# Patient Record
Sex: FEMALE | Race: WHITE | Employment: FULL TIME | URBAN - METROPOLITAN AREA
[De-identification: names, ages, dates, MRNs, and addresses within clinical notes are randomized per-mention and may not be internally consistent; named-entity substitution may affect disease eponyms.]

---

## 2017-11-18 ENCOUNTER — APPOINTMENT (OUTPATIENT)
Dept: CT IMAGING | Age: 52
End: 2017-11-18
Attending: EMERGENCY MEDICINE
Payer: COMMERCIAL

## 2017-11-18 ENCOUNTER — APPOINTMENT (OUTPATIENT)
Dept: GENERAL RADIOLOGY | Age: 52
End: 2017-11-18
Attending: EMERGENCY MEDICINE
Payer: COMMERCIAL

## 2017-11-18 ENCOUNTER — HOSPITAL ENCOUNTER (EMERGENCY)
Age: 52
Discharge: HOME | End: 2017-11-19
Attending: EMERGENCY MEDICINE
Payer: COMMERCIAL

## 2017-11-18 DIAGNOSIS — R20.0 NUMBNESS OF LEFT HAND: ICD-10-CM

## 2017-11-18 DIAGNOSIS — R07.9 ACUTE CHEST PAIN: Primary | ICD-10-CM

## 2017-11-18 LAB
ALBUMIN SERPL-MCNC: 3.4 G/DL (ref 3.5–5)
ALBUMIN/GLOB SERPL: 1 {RATIO} (ref 1.1–2.2)
ALP SERPL-CCNC: 51 U/L (ref 45–117)
ALT SERPL-CCNC: 20 U/L (ref 12–78)
ANION GAP SERPL CALC-SCNC: 9 MMOL/L (ref 5–15)
AST SERPL-CCNC: 13 U/L (ref 15–37)
BASOPHILS # BLD: 0 K/UL (ref 0–0.1)
BASOPHILS NFR BLD: 1 % (ref 0–1)
BILIRUB SERPL-MCNC: 0.2 MG/DL (ref 0.2–1)
BUN SERPL-MCNC: 16 MG/DL (ref 6–20)
BUN/CREAT SERPL: 21 (ref 12–20)
CALCIUM SERPL-MCNC: 8.8 MG/DL (ref 8.5–10.1)
CHLORIDE SERPL-SCNC: 107 MMOL/L (ref 97–108)
CO2 SERPL-SCNC: 26 MMOL/L (ref 21–32)
CREAT SERPL-MCNC: 0.77 MG/DL (ref 0.55–1.02)
D DIMER PPP FEU-MCNC: 1.23 MG/L FEU (ref 0–0.65)
EOSINOPHIL # BLD: 0.1 K/UL (ref 0–0.4)
EOSINOPHIL NFR BLD: 2 % (ref 0–7)
ERYTHROCYTE [DISTWIDTH] IN BLOOD BY AUTOMATED COUNT: 12.7 % (ref 11.5–14.5)
GLOBULIN SER CALC-MCNC: 3.4 G/DL (ref 2–4)
GLUCOSE SERPL-MCNC: 121 MG/DL (ref 65–100)
HCT VFR BLD AUTO: 38.4 % (ref 35–47)
HGB BLD-MCNC: 12.6 G/DL (ref 11.5–16)
LYMPHOCYTES # BLD: 2.8 K/UL (ref 0.8–3.5)
LYMPHOCYTES NFR BLD: 37 % (ref 12–49)
MCH RBC QN AUTO: 30.4 PG (ref 26–34)
MCHC RBC AUTO-ENTMCNC: 32.8 G/DL (ref 30–36.5)
MCV RBC AUTO: 92.8 FL (ref 80–99)
MONOCYTES # BLD: 0.5 K/UL (ref 0–1)
MONOCYTES NFR BLD: 6 % (ref 5–13)
NEUTS SEG # BLD: 4.2 K/UL (ref 1.8–8)
NEUTS SEG NFR BLD: 54 % (ref 32–75)
PLATELET # BLD AUTO: 216 K/UL (ref 150–400)
POTASSIUM SERPL-SCNC: 3.5 MMOL/L (ref 3.5–5.1)
PROT SERPL-MCNC: 6.8 G/DL (ref 6.4–8.2)
RBC # BLD AUTO: 4.14 M/UL (ref 3.8–5.2)
SODIUM SERPL-SCNC: 142 MMOL/L (ref 136–145)
TROPONIN I SERPL-MCNC: <0.04 NG/ML
WBC # BLD AUTO: 7.6 K/UL (ref 3.6–11)

## 2017-11-18 PROCEDURE — 85379 FIBRIN DEGRADATION QUANT: CPT | Performed by: EMERGENCY MEDICINE

## 2017-11-18 PROCEDURE — 96374 THER/PROPH/DIAG INJ IV PUSH: CPT

## 2017-11-18 PROCEDURE — 93005 ELECTROCARDIOGRAM TRACING: CPT

## 2017-11-18 PROCEDURE — 96361 HYDRATE IV INFUSION ADD-ON: CPT

## 2017-11-18 PROCEDURE — 99284 EMERGENCY DEPT VISIT MOD MDM: CPT

## 2017-11-18 PROCEDURE — 70450 CT HEAD/BRAIN W/O DYE: CPT

## 2017-11-18 PROCEDURE — 85025 COMPLETE CBC W/AUTO DIFF WBC: CPT | Performed by: EMERGENCY MEDICINE

## 2017-11-18 PROCEDURE — 71275 CT ANGIOGRAPHY CHEST: CPT

## 2017-11-18 PROCEDURE — 36415 COLL VENOUS BLD VENIPUNCTURE: CPT | Performed by: EMERGENCY MEDICINE

## 2017-11-18 PROCEDURE — 80053 COMPREHEN METABOLIC PANEL: CPT | Performed by: EMERGENCY MEDICINE

## 2017-11-18 PROCEDURE — 84484 ASSAY OF TROPONIN QUANT: CPT | Performed by: EMERGENCY MEDICINE

## 2017-11-18 PROCEDURE — 71020 XR CHEST PA LAT: CPT

## 2017-11-18 RX ORDER — SODIUM CHLORIDE 0.9 % (FLUSH) 0.9 %
10 SYRINGE (ML) INJECTION
Status: COMPLETED | OUTPATIENT
Start: 2017-11-18 | End: 2017-11-19

## 2017-11-19 VITALS
BODY MASS INDEX: 31.41 KG/M2 | WEIGHT: 184 LBS | OXYGEN SATURATION: 94 % | HEIGHT: 64 IN | TEMPERATURE: 98.3 F | RESPIRATION RATE: 15 BRPM | DIASTOLIC BLOOD PRESSURE: 60 MMHG | HEART RATE: 65 BPM | SYSTOLIC BLOOD PRESSURE: 101 MMHG

## 2017-11-19 LAB
ATRIAL RATE: 73 BPM
CALCULATED P AXIS, ECG09: 47 DEGREES
CALCULATED R AXIS, ECG10: -8 DEGREES
CALCULATED T AXIS, ECG11: 22 DEGREES
DIAGNOSIS, 93000: NORMAL
P-R INTERVAL, ECG05: 144 MS
Q-T INTERVAL, ECG07: 424 MS
QRS DURATION, ECG06: 84 MS
QTC CALCULATION (BEZET), ECG08: 467 MS
TROPONIN I BLD-MCNC: <0.04 NG/ML (ref 0–0.08)
VENTRICULAR RATE, ECG03: 73 BPM

## 2017-11-19 PROCEDURE — 74011000258 HC RX REV CODE- 258: Performed by: EMERGENCY MEDICINE

## 2017-11-19 PROCEDURE — 74011636320 HC RX REV CODE- 636/320: Performed by: EMERGENCY MEDICINE

## 2017-11-19 PROCEDURE — 74011250636 HC RX REV CODE- 250/636: Performed by: EMERGENCY MEDICINE

## 2017-11-19 PROCEDURE — 84484 ASSAY OF TROPONIN QUANT: CPT

## 2017-11-19 RX ORDER — KETOROLAC TROMETHAMINE 30 MG/ML
15 INJECTION, SOLUTION INTRAMUSCULAR; INTRAVENOUS
Status: COMPLETED | OUTPATIENT
Start: 2017-11-19 | End: 2017-11-19

## 2017-11-19 RX ADMIN — Medication 10 ML: at 00:08

## 2017-11-19 RX ADMIN — SODIUM CHLORIDE 100 ML: 900 INJECTION, SOLUTION INTRAVENOUS at 00:08

## 2017-11-19 RX ADMIN — KETOROLAC TROMETHAMINE 15 MG: 30 INJECTION, SOLUTION INTRAMUSCULAR at 00:39

## 2017-11-19 RX ADMIN — IOPAMIDOL 70 ML: 755 INJECTION, SOLUTION INTRAVENOUS at 00:08

## 2017-11-19 NOTE — ED PROVIDER NOTES
HPI Comments: 50 y.o. female with past medical history significant for thyroid cancer who presents to the ED with chief complaint of chest pain. Patient reports an episode of \"sharp\" mid chest pain with onset \"this morning,\" reports waking up with her pain. Patient reports a second episode of chest pain ~2 hours ago. Patient reports her pain is worse with taking deep breaths. Patient reports undergoing a preventative screening colonoscopy \"yesterday,\" reports being told it was \"normal.\" Patient reports numbness in her left five fingers with onset ~1 week ago; denies any numbness in her hand, wrist, or arm. Patient reports a history of \"two\" c-sections and \"three\" vaginal deliveries. Patient reports a history of thyroid cancer and subsequent thyroidectomy; reports being on Synthroid regularly now. Patient denies being on any other hormone replacement therapy. Patient reports a history of a hysterectomy. Patient reports her mother has a history of \"four\" cardiac stent placements; reports her father  of an MI at the age of ~62 years. Patient denies tobacco use. Patient denies hand pain, neck pain, leg pain, leg swelling, cough, fever, SOB, and weakness. There are no other acute medical concerns at this time. PCP: None    Note written by Lourdes Sheets, as dictated by Arabella Meehan MD 10:16 PM     The history is provided by the patient. Past Medical History:   Diagnosis Date    Thyroid cancer ([facility] [address]     @ 24years old       Past Surgical History:   Procedure Laterality Date    HX  SECTION      HX THYROIDECTOMY      HX TOTAL LAPAROSCOPIC HYSTERECTOMY           History reviewed. No pertinent family history. Social History     Social History    Marital status:      Spouse name: N/A    Number of children: N/A    Years of education: N/A     Occupational History    Not on file.      Social History Main Topics    Smoking status: Never Smoker    Smokeless tobacco: Not on file    Alcohol use Yes      Comment: socially- 3 or 4 days a week    Drug use: Not on file    Sexual activity: Not on file     Other Topics Concern    Not on file     Social History Narrative         ALLERGIES: Review of patient's allergies indicates no known allergies. Review of Systems   Constitutional: Negative for fever. Respiratory: Negative for cough and shortness of breath. Cardiovascular: Positive for chest pain. Negative for leg swelling. Musculoskeletal: Negative for myalgias and neck pain. Neurological: Positive for numbness. Negative for weakness. All other systems reviewed and are negative.       Vitals:    11/18/17 2117   BP: 110/71   Pulse: 81   Resp: 18   Temp: 98.3 °F (36.8 °C)   SpO2: 99%   Weight: 83.5 kg (184 lb)   Height: 5' 4\" (1.626 m)            Physical Exam   Physical Examination: General appearance - alert, well appearing, and in no distress, oriented to person, place, and time and normal appearing weight  Eyes - pupils equal and reactive, extraocular eye movements intact  Neck - supple, no significant adenopathy  Chest - clear to auscultation, no wheezes, rales or rhonchi, symmetric air entry, mild tenderness to anterior chest wall, no crepitus or subcutaneous air  Heart - normal rate, regular rhythm, normal S1, S2, no murmurs, rubs, clicks or gallops  Abdomen - soft, nontender, nondistended, no masses or organomegaly  Back exam - full range of motion, no tenderness, palpable spasm or pain on motion  Neurological - alert, oriented, normal speech, no focal findings or movement disorder noted  Musculoskeletal - no joint tenderness, deformity or swelling  Extremities - peripheral pulses normal, no pedal edema, no clubbing or cyanosis  Skin - normal coloration and turgor, no rashes, no suspicious skin lesions noted  MDM  Number of Diagnoses or Management Options     Amount and/or Complexity of Data Reviewed  Clinical lab tests: ordered and reviewed  Tests in the radiology section of CPT®: ordered and reviewed  Decide to obtain previous medical records or to obtain history from someone other than the patient: yes  Review and summarize past medical records: yes  Independent visualization of images, tracings, or specimens: yes    Patient Progress  Patient progress: stable    ED Course       Procedures  EKG interpretation: (Preliminary)  Rhythm: normal sinus rhythm; and regular . Rate (approx.): 73; Axis: left axis deviation; MD interval: normal; QRS interval: normal ; ST/T wave: non-specific changes; t wave inversion III, slightly prolonged QTc    Pt offered and refused pain medication at the time of initial exam.    F/u with neurology for left hand numbness-ongoing for 1 week. F/u with cardiology for chest pain. Return to ED for worsening symptoms or weakness/progressive neurologic symptoms. VSS.

## 2017-11-19 NOTE — DISCHARGE INSTRUCTIONS
Chest Pain: Care Instructions  Your Care Instructions    There are many things that can cause chest pain. Some are not serious and will get better on their own in a few days. But some kinds of chest pain need more testing and treatment. Your doctor may have recommended a follow-up visit in the next 8 to 12 hours. If you are not getting better, you may need more tests or treatment. Even though your doctor has released you, you still need to watch for any problems. The doctor carefully checked you, but sometimes problems can develop later. If you have new symptoms or if your symptoms do not get better, get medical care right away. If you have worse or different chest pain or pressure that lasts more than 5 minutes or you passed out (lost consciousness), call 911 or seek other emergency help right away. A medical visit is only one step in your treatment. Even if you feel better, you still need to do what your doctor recommends, such as going to all suggested follow-up appointments and taking medicines exactly as directed. This will help you recover and help prevent future problems. How can you care for yourself at home? · Rest until you feel better. · Take your medicine exactly as prescribed. Call your doctor if you think you are having a problem with your medicine. · Do not drive after taking a prescription pain medicine. When should you call for help? Call 911 if:  ? · You passed out (lost consciousness). ? · You have severe difficulty breathing. ? · You have symptoms of a heart attack. These may include:  ¨ Chest pain or pressure, or a strange feeling in your chest.  ¨ Sweating. ¨ Shortness of breath. ¨ Nausea or vomiting. ¨ Pain, pressure, or a strange feeling in your back, neck, jaw, or upper belly or in one or both shoulders or arms. ¨ Lightheadedness or sudden weakness. ¨ A fast or irregular heartbeat.   After you call 911, the  may tell you to chew 1 adult-strength or 2 to 4 low-dose aspirin. Wait for an ambulance. Do not try to drive yourself. ?Call your doctor today if:  ? · You have any trouble breathing. ? · Your chest pain gets worse. ? · You are dizzy or lightheaded, or you feel like you may faint. ? · You are not getting better as expected. ? · You are having new or different chest pain. Where can you learn more? Go to http://regina-angel.info/. Enter A120 in the search box to learn more about \"Chest Pain: Care Instructions. \"  Current as of: March 20, 2017  Content Version: 11.4  © 3498-4101 [employer]. Care instructions adapted under license by [employer] (which disclaims liability or warranty for this information). If you have questions about a medical condition or this instruction, always ask your healthcare professional. [address] any warranty or liability for your use of this information. Numbness and Tingling: Care Instructions  Your Care Instructions    Many things can cause numbness or tingling. Swelling may put pressure on a nerve. This could cause you to lose feeling or have a pins-and-needles sensation on part of your body. Nerves may be damaged from trauma, toxins, or diseases, such as diabetes or multiple sclerosis (MS). Sometimes, though, the cause is not clear. If there is no clear reason for your symptoms, and you are not having any other symptoms, your doctor may suggest watching and waiting for a while to see if the numbness or tingling goes away on its own. Your doctor may want you to have blood or nerve tests to find the cause of your symptoms. Follow-up care is a key part of your treatment and safety. Be sure to make and go to all appointments, and call your doctor if you are having problems. It's also a good idea to know your test results and keep a list of the medicines you take. How can you care for yourself at home?   · If your doctor prescribes medicine, take it exactly as directed. Call your doctor if you think you are having a problem with your medicine. · If you have any swelling, put ice or a cold pack on the area for 10 to 20 minutes at a time. Put a thin cloth between the ice and your skin. When should you call for help? Call 911 anytime you think you may need emergency care. For example, call if:  ? · You have weakness, numbness, or tingling in both legs. ? · You lose bowel or bladder control. ? · You have symptoms of a stroke. These may include:  ¨ Sudden numbness, tingling, weakness, or loss of movement in your face, arm, or leg, especially on only one side of your body. ¨ Sudden vision changes. ¨ Sudden trouble speaking. ¨ Sudden confusion or trouble understanding simple statements. ¨ Sudden problems with walking or balance. ¨ A sudden, severe headache that is different from past headaches. ? Watch closely for changes in your health, and be sure to contact your doctor if you have any problems, or if:  ? · You do not get better as expected. Where can you learn more? Go to http://regina-angel.info/. Enter F661 in the search box to learn more about \"Numbness and Tingling: Care Instructions. \"  Current as of: October 14, 2016  Content Version: 11.4  © 0487-8263 [employer]. Care instructions adapted under license by [employer] (which disclaims liability or warranty for this information). If you have questions about a medical condition or this instruction, always ask your healthcare professional. [place] [address] any warranty or liability for your use of this information.

## 2017-11-19 NOTE — ED TRIAGE NOTES
Triage: Patient arrives ambulatory from home reporting numbness to left fingers x 1 week constantly. Patient also reports sharp left chest pain waking her from sleep yesterday night that quickly subsided. Reports tonight it came on suddenly again at 80 while she was lying down and has been constant ever since. No cardiac hx.

## 2017-11-27 NOTE — PROGRESS NOTES
Left VM for patient to call back to schedule hospital f/u with either WENDY Ordonez or WENDY Whitney

## 2017-11-29 ENCOUNTER — OFFICE VISIT (OUTPATIENT)
Dept: NEUROLOGY | Age: 52
End: 2017-11-29

## 2017-11-29 VITALS
DIASTOLIC BLOOD PRESSURE: 67 MMHG | HEART RATE: 72 BPM | BODY MASS INDEX: 31.58 KG/M2 | WEIGHT: 185 LBS | OXYGEN SATURATION: 95 % | RESPIRATION RATE: 18 BRPM | SYSTOLIC BLOOD PRESSURE: 115 MMHG | HEIGHT: 64 IN

## 2017-11-29 DIAGNOSIS — R20.2 PARESTHESIAS IN LEFT HAND: Primary | ICD-10-CM

## 2017-11-29 NOTE — PROGRESS NOTES
Chief Complaint   Patient presents with    Tingling     Bilateral hands numbness and tingling for about 6 weeks more in the left hand than right. 1. Have you been to the ER, urgent care clinic since your last visit? Hospitalized since your last visit?11/21/17 [facility] ER for chest pain and bilateral numbness in hands. 2. Have you seen or consulted any other health care providers outside of the [address] [place] [address] since your last visit? Include any pap smears or colon screening.  NO

## 2017-11-29 NOTE — PATIENT INSTRUCTIONS
Numbness and Tingling: Care Instructions  Your Care Instructions    Many things can cause numbness or tingling. Swelling may put pressure on a nerve. This could cause you to lose feeling or have a pins-and-needles sensation on part of your body. Nerves may be damaged from trauma, toxins, or diseases, such as diabetes or multiple sclerosis (MS). Sometimes, though, the cause is not clear. If there is no clear reason for your symptoms, and you are not having any other symptoms, your doctor may suggest watching and waiting for a while to see if the numbness or tingling goes away on its own. Your doctor may want you to have blood or nerve tests to find the cause of your symptoms. Follow-up care is a key part of your treatment and safety. Be sure to make and go to all appointments, and call your doctor if you are having problems. It's also a good idea to know your test results and keep a list of the medicines you take. How can you care for yourself at home? · If your doctor prescribes medicine, take it exactly as directed. Call your doctor if you think you are having a problem with your medicine. · If you have any swelling, put ice or a cold pack on the area for 10 to 20 minutes at a time. Put a thin cloth between the ice and your skin. When should you call for help? Call 911 anytime you think you may need emergency care. For example, call if:  ? · You have weakness, numbness, or tingling in both legs. ? · You lose bowel or bladder control. ? · You have symptoms of a stroke. These may include:  ¨ Sudden numbness, tingling, weakness, or loss of movement in your face, arm, or leg, especially on only one side of your body. ¨ Sudden vision changes. ¨ Sudden trouble speaking. ¨ Sudden confusion or trouble understanding simple statements. ¨ Sudden problems with walking or balance. ¨ A sudden, severe headache that is different from past headaches. ? Watch closely for changes in your health, and be sure to contact your doctor if you have any problems, or if:  ? · You do not get better as expected. Where can you learn more? Go to http://regina-angel.info/. Enter R641 in the search box to learn more about \"Numbness and Tingling: Care Instructions. \"  Current as of: October 14, 2016  Content Version: 11.4  © 2414-2285 [employer]. Care instructions adapted under license by [employer] (which disclaims liability or warranty for this information). If you have questions about a medical condition or this instruction, always ask your healthcare professional. [place] [address] any warranty or liability for your use of this information.

## 2017-11-29 NOTE — PROGRESS NOTES
Chief Complaint   Patient presents with    Tingling     Bilateral hands numbness and tingling for about 6 weeks more in the left hand than right. Referred by: Dr. Wendy Alfredo in the emergency room      HPI    Darris Lefort is a 82-year-old woman here for numbness. She was in the emergency room recently for chest pain and while there she commented she had been having left hand numbness described as pins and needles affecting all 5 digits of her hand. This is been going on for about 6 weeks. It wakes her up at night. It is not painful but sometimes uncomfortable. It does not affect her use of the hand. No weakness. No radiating symptoms. No changing symptoms. No neck pain or radicular symptoms. It has remained static. Review of Systems   Neurological: Positive for tingling and sensory change. All other systems reviewed and are negative. Past Medical History:   Diagnosis Date    Thyroid cancer [facility])     @ 24years old     History reviewed. No pertinent family history. Social History     Social History    Marital status:      Spouse name: N/A    Number of children: N/A    Years of education: N/A     Occupational History    Not on file. Social History Main Topics    Smoking status: Never Smoker    Smokeless tobacco: Never Used    Alcohol use Yes      Comment: socially- 3 or 4 days a week    Drug use: Not on file    Sexual activity: Not on file     Other Topics Concern    Not on file     Social History Narrative     Current Outpatient Prescriptions   Medication Sig    levothyroxine (SYNTHROID) 175 mcg tablet Take 150 mcg by mouth Daily (before breakfast).  liothyronine (CYTOMEL) 5 mcg tablet Take 5 mcg by mouth daily.  Venlafaxine 75 mg tr24 Take 75 mg by mouth daily.  HYDROcodone-acetaminophen (NORCO) 5-325 mg per tablet Take 1 tablet by mouth every four (4) hours as needed for Pain.     ondansetron (ZOFRAN ODT) 4 mg disintegrating tablet Take 1 tablet by mouth every eight (8) hours as needed for Nausea.  predniSONE (STERAPRED DS) 10 mg dose pack Take as directed. No current facility-administered medications for this visit. No Known Allergies      Neurologic Exam     Mental Status   Oriented to person, place, and time. Cranial Nerves   Cranial nerves II through XII intact. Motor Exam   Muscle bulk: normal    Strength   Strength 5/5 throughout. Sensory Exam   Light touch normal.     Gait, Coordination, and Reflexes     Gait  Gait: normal    Reflexes   Right brachioradialis: 2+  Left brachioradialis: 2+  Right biceps: 2+  Left biceps: 2+  Right triceps: 2+  Left triceps: 2+  Right patellar: 2+  Left patellar: 2+    Physical Exam   Constitutional: She is oriented to person, place, and time. She appears well-developed and well-nourished. Cardiovascular: Normal rate. Pulmonary/Chest: Effort normal.   Neurological: She is oriented to person, place, and time. She has normal strength. Gait normal.   Reflex Scores:       Tricep reflexes are 2+ on the right side and 2+ on the left side. Bicep reflexes are 2+ on the right side and 2+ on the left side. Brachioradialis reflexes are 2+ on the right side and 2+ on the left side. Patellar reflexes are 2+ on the right side and 2+ on the left side. Skin: Skin is dry. Vitals reviewed. Visit Vitals    /67 (BP 1 Location: Left arm, BP Patient Position: Sitting)    Pulse 72    Resp 18    Ht 5' 4\" (1.626 m)    Wt 83.9 kg (185 lb)    SpO2 95%    BMI 31.76 kg/m2     Lab Results  Component Value Date/Time   WBC 7.6 11/18/2017 09:40 PM   HGB 12.6 11/18/2017 09:40 PM   HCT 38.4 11/18/2017 09:40 PM   PLATELET 017 23/97/2497 09:40 PM   MCV 92.8 11/18/2017 09:40 PM     Lab Results  Component Value Date/Time   ALT (SGPT) 20 11/18/2017 09:40 PM   AST (SGOT) 13 11/18/2017 09:40 PM   Alk.  phosphatase 51 11/18/2017 09:40 PM   Bilirubin, total 0.2 11/18/2017 09:40 PM   Albumin 3.4 11/18/2017 09:40 PM Protein, total 6.8 11/18/2017 09:40 PM   PLATELET 422 66/87/3885 09:40 PM              CT Results (maximum last 3): Results from [place] encounter on 11/18/17   CTA CHEST W OR W WO CONT   Narrative Clinical indication: Pleuritic chest pain, elevated diameter. Localizer obtained without contrast at the level of the pulmonary arteries. Fast  injection rate of 70 cc of Isovue-370 with review of the raw data and MIP  reconstructions. No prior. CT dose reduction was achieved through the use of a  standardized protocol tailored for this examination and automatic exposure  control for dose modulation . Ami Jain There is no evidence for pulmonary embolism. There is no pericardial or pleural  effusion. There is no shift or pneumothorax no masses or adenopathy. Impression IMPRESSION: No acute changes. CT HEAD WO CONT   Narrative EXAM:  CT HEAD WO CONT    INDICATION:   left hand numbness    COMPARISON: None. CONTRAST:  None. TECHNIQUE: Unenhanced CT of the head was performed using 5 mm images. Brain and  bone windows were generated. CT dose reduction was achieved through use of a  standardized protocol tailored for this examination and automatic exposure  control for dose modulation. FINDINGS:  There is no extra-axial fluid collection hemorrhage shift or masses her. Impression impression: Negative. Assessment and Plan   Diagnoses and all orders for this visit:    1. Paresthesias in left hand      73-year-old woman having persistent left distal hand paresthesias affecting only the fingers without change since onset. Exam unrevealing for any atrophy or weakness. I suspect this is peripheral in nature. Head CT in the emergency room was negative. No upper motor neuron signs on exam.  I think is reasonable for her to try a wrist splint at night. If it does not improve she can call us and I will order a nerve conduction study. Follow-up as needed.       A notice of this visit/encounter being completed has been sent electronically to the patient's PCP and/or referring provider.      [address] [facility], [facility] [address]  Diplomate ABPN

## 2017-11-29 NOTE — MR AVS SNAPSHOT
Visit Information Date & Time Provider Department Dept. Phone Encounter #  
 11/29/2017  1:00  Formerly [facility], [address] Neurology Clinic at [address] [id number] Follow-up Instructions Return if symptoms worsen or fail to improve. Your Appointments 1/16/2018 11:00 AM  
New Patient with Shahla Christine MD  
[facility] Medicine [facility]) Appt Note: to get est  
 [address] [facility] [address]  
[address] [address] [address] [place] [address] 57 Upcoming Health Maintenance Date Due  
 PAP AKA CERVICAL CYTOLOGY 5/5/1988 BREAST CANCER SCRN MAMMOGRAM 5/5/2017 FOBT Q 1 YEAR AGE 50-75 5/5/2017 Influenza Age 5 to Adult 8/1/2017 DTaP/Tdap/Td series (2 - Td) 5/14/2026 Allergies as of 11/29/2017  Review Complete On: 11/29/2017 By: Rosanna Betancourt LPN No Known Allergies Current Immunizations  Never Reviewed Name Date Tdap 5/14/2016  5:40 PM  
  
 Not reviewed this visit You Were Diagnosed With   
  
 Codes Comments Paresthesias in left hand    -  Primary ICD-10-CM: R20.2 ICD-9-CM: 357. 0 Vitals BP Pulse Resp Height(growth percentile) Weight(growth percentile) SpO2  
 115/67 (BP 1 Location: Left arm, BP Patient Position: Sitting) 72 18 5' 4\" (1.626 m) 185 lb (83.9 kg) 95% BMI OB Status Smoking Status 31.76 kg/m2 Hysterectomy Never Smoker Vitals History BMI and BSA Data Body Mass Index Body Surface Area 31.76 kg/m 2 1.95 m 2 Preferred Pharmacy Pharmacy Name Phone [id number] [place] [address] [facility] [phone] Your Updated Medication List  
  
   
This list is accurate as of: 11/29/17  1:16 PM.  Always use your most recent med list.  
  
  
  
  
 CYTOMEL 5 mcg tablet Generic drug:  liothyronine Take 5 mcg by mouth daily. HYDROcodone-acetaminophen 5-325 mg per tablet Commonly known as:  Viva Elias Take 1 tablet by mouth every four (4) hours as needed for Pain. ondansetron 4 mg disintegrating tablet Commonly known as:  ZOFRAN ODT Take 1 tablet by mouth every eight (8) hours as needed for Nausea. predniSONE 10 mg dose pack Commonly known as:  STERAPRED DS Take as directed. SYNTHROID 175 mcg tablet Generic drug:  levothyroxine Take 150 mcg by mouth Daily (before breakfast). Venlafaxine 75 mg Tr24 Take 75 mg by mouth daily. Follow-up Instructions Return if symptoms worsen or fail to improve. Patient Instructions Numbness and Tingling: Care Instructions Your Care Instructions Many things can cause numbness or tingling. Swelling may put pressure on a nerve. This could cause you to lose feeling or have a pins-and-needles sensation on part of your body. Nerves may be damaged from trauma, toxins, or diseases, such as diabetes or multiple sclerosis (MS). Sometimes, though, the cause is not clear. If there is no clear reason for your symptoms, and you are not having any other symptoms, your doctor may suggest watching and waiting for a while to see if the numbness or tingling goes away on its own. Your doctor may want you to have blood or nerve tests to find the cause of your symptoms. Follow-up care is a key part of your treatment and safety. Be sure to make and go to all appointments, and call your doctor if you are having problems. It's also a good idea to know your test results and keep a list of the medicines you take. How can you care for yourself at home? · If your doctor prescribes medicine, take it exactly as directed. Call your doctor if you think you are having a problem with your medicine. · If you have any swelling, put ice or a cold pack on the area for 10 to 20 minutes at a time. Put a thin cloth between the ice and your skin. When should you call for help? Call 911 anytime you think you may need emergency care. For example, call if: 
? · You have weakness, numbness, or tingling in both legs. ? · You lose bowel or bladder control. ? · You have symptoms of a stroke. These may include: 
¨ Sudden numbness, tingling, weakness, or loss of movement in your face, arm, or leg, especially on only one side of your body. ¨ Sudden vision changes. ¨ Sudden trouble speaking. ¨ Sudden confusion or trouble understanding simple statements. ¨ Sudden problems with walking or balance. ¨ A sudden, severe headache that is different from past headaches. ? Watch closely for changes in your health, and be sure to contact your doctor if you have any problems, or if: 
? · You do not get better as expected. Where can you learn more? Go to http://regina-angel.info/. Enter P868 in the search box to learn more about \"Numbness and Tingling: Care Instructions. \" Current as of: October 14, 2016 Content Version: 11.4 © 6816-9386 [employer]. Care instructions adapted under license by [employer] (which disclaims liability or warranty for this information). If you have questions about a medical condition or this instruction, always ask your healthcare professional. [address] any warranty or liability for your use of this information. Introducing [facility] & HEALTH SERVICES! [employer] introduces [employer] patient portal. Now you can access parts of your medical record, email your doctor's office, and request medication refills online. 1. In your internet browser, go to [email or web address] 2. Click on the First Time User? Click Here link in the Sign In box. You will see the New Member Sign Up page. 3. Enter your [employer] Access Code exactly as it appears below. You will not need to use this code after youve completed the sign-up process.  If you do not sign up before the expiration date, you must request a new code. · [facility] Access Code: [email or web address] Expires: 2/17/2018 12:58 AM 
 
4. Enter the last four digits of your Social Security Number (xxxx) and Date of Birth (mm/dd/yyyy) as indicated and click Submit. You will be taken to the next sign-up page. 5. Create a [facility] ID. This will be your [facility] login ID and cannot be changed, so think of one that is secure and easy to remember. 6. Create a [facility] password. You can change your password at any time. 7. Enter your Password Reset Question and Answer. This can be used at a later time if you forget your password. 8. Enter your e-mail address. You will receive e-mail notification when new information is available in [address] 9. Click Sign Up. You can now view and download portions of your medical record. 10. Click the Download Summary menu link to download a portable copy of your medical information. If you have questions, please visit the Frequently Asked Questions section of the [facility] website. Remember, [facility] is NOT to be used for urgent needs. For medical emergencies, dial 911. Now available from your iPhone and Android! Please provide this summary of care documentation to your next provider. Your primary care clinician is listed as NONE. If you have any questions after today's visit, please call [phone].

## 2018-01-16 ENCOUNTER — OFFICE VISIT (OUTPATIENT)
Dept: INTERNAL MEDICINE CLINIC | Age: 53
End: 2018-01-16

## 2018-01-16 VITALS
OXYGEN SATURATION: 98 % | BODY MASS INDEX: 32.17 KG/M2 | TEMPERATURE: 97.3 F | SYSTOLIC BLOOD PRESSURE: 94 MMHG | WEIGHT: 188.4 LBS | RESPIRATION RATE: 14 BRPM | HEIGHT: 64 IN | HEART RATE: 68 BPM | DIASTOLIC BLOOD PRESSURE: 64 MMHG

## 2018-01-16 DIAGNOSIS — E89.0 POSTOPERATIVE HYPOTHYROIDISM: ICD-10-CM

## 2018-01-16 DIAGNOSIS — D22.9 NUMEROUS MOLES: ICD-10-CM

## 2018-01-16 DIAGNOSIS — F41.9 ANXIETY: ICD-10-CM

## 2018-01-16 DIAGNOSIS — I87.303 STASIS EDEMA OF BOTH LOWER EXTREMITIES: ICD-10-CM

## 2018-01-16 DIAGNOSIS — Z00.00 WELL WOMAN EXAM (NO GYNECOLOGICAL EXAM): Primary | ICD-10-CM

## 2018-01-16 DIAGNOSIS — E66.9 OBESITY (BMI 30-39.9): ICD-10-CM

## 2018-01-16 DIAGNOSIS — Z85.850 H/O PAPILLARY ADENOCARCINOMA OF THYROID: ICD-10-CM

## 2018-01-16 RX ORDER — HYDROCHLOROTHIAZIDE 12.5 MG/1
12.5 CAPSULE ORAL
Qty: 90 CAP | Refills: 1 | Status: SHIPPED | OUTPATIENT
Start: 2018-01-16 | End: 2018-07-18 | Stop reason: SDUPTHER

## 2018-01-16 RX ORDER — LEVOTHYROXINE SODIUM 150 MCG
TABLET ORAL
Refills: 2 | Status: ON HOLD | COMMUNITY
Start: 2017-11-14 | End: 2019-04-29

## 2018-01-16 RX ORDER — HYDROCHLOROTHIAZIDE 12.5 MG/1
12.5 CAPSULE ORAL DAILY
COMMUNITY
End: 2018-01-16 | Stop reason: SDUPTHER

## 2018-01-16 RX ORDER — VENLAFAXINE HYDROCHLORIDE 75 MG/1
75 CAPSULE, EXTENDED RELEASE ORAL DAILY
Qty: 30 CAP | Refills: 1 | Status: SHIPPED | OUTPATIENT
Start: 2018-01-16 | End: 2018-04-03 | Stop reason: SDUPTHER

## 2018-01-16 NOTE — MR AVS SNAPSHOT
[address] [address] 
[phone] Patient: Manjinder Nolasco MRN: [id number] Visit Information Date & Time Provider Department Dept. Phone Encounter #  
 1/16/2018 11:00 AM Lior Wong MD [address] [address] Internal Medicine [phone] [id number] Follow-up Instructions Return in about 8 weeks (around 3/13/2018) for Follow-up Anxiety. Upcoming Health Maintenance Date Due  
 PAP AKA CERVICAL CYTOLOGY 5/5/1988 BREAST CANCER SCRN MAMMOGRAM 5/5/2017 FOBT Q 1 YEAR AGE 50-75 5/5/2017 Influenza Age 5 to Adult 8/1/2017 DTaP/Tdap/Td series (2 - Td) 5/14/2026 Allergies as of 1/16/2018  Review Complete On: 1/16/2018 By: Lior Wong MD  
  
 Severity Noted Reaction Type Reactions Azithromycin  01/16/2018    Other (comments) Upset stomach Current Immunizations  Never Reviewed Name Date Tdap 5/14/2016  5:40 PM  
  
 Not reviewed this visit You Were Diagnosed With   
  
 Codes Comments Well woman exam (no gynecological exam)    -  Primary ICD-10-CM: Z00.00 ICD-9-CM: V70.0 Anxiety     ICD-10-CM: F41.9 ICD-9-CM: 300.00 H/O papillary adenocarcinoma of thyroid     ICD-10-CM: Z85.850 ICD-9-CM: V10.87 Postoperative hypothyroidism     ICD-10-CM: E89.0 ICD-9-CM: 244.0 Stasis edema of both lower extremities     ICD-10-CM: I87.303 ICD-9-CM: 459.30 Numerous moles     ICD-10-CM: D22.9 ICD-9-CM: 216.9 Vitals BP Pulse Temp Resp Height(growth percentile) Weight(growth percentile) 94/64 (BP 1 Location: Right arm, BP Patient Position: Sitting) 68 97.3 °F (36.3 °C) (Oral) 14 5' 3.78\" (1.62 m) 188 lb 6.4 oz (85.5 kg) SpO2 BMI OB Status Smoking Status 98% 32.56 kg/m2 Hysterectomy Never Smoker Vitals History BMI and BSA Data Body Mass Index Body Surface Area 32.56 kg/m 2 1.96 m 2 Preferred Pharmacy Pharmacy Name Phone [id number] [place] [phone] Your Updated Medication List  
  
   
This list is accurate as of: 1/16/18 11:37 AM.  Always use your most recent med list.  
  
  
  
  
 hydroCHLOROthiazide 12.5 mg capsule Commonly known as:  Deneice Sarks Take 1 Cap by mouth daily as needed. For severe edema SYNTHROID 150 mcg tablet Generic drug:  levothyroxine TAKE 1 TABLET EVERY DAY  
  
 venlafaxine-SR 75 mg capsule Commonly known as:  EFFEXOR-XR Take 1 Cap by mouth daily. Prescriptions Sent to Pharmacy Refills  
 hydroCHLOROthiazide (MICROZIDE) 12.5 mg capsule 1 Sig: Take 1 Cap by mouth daily as needed. For severe edema Class: Normal  
 Pharmacy: [address] [place] [address], [address] [place] [address] Ph #: [phone] Route: Oral  
 venlafaxine-SR (EFFEXOR-XR) 75 mg capsule 1 Sig: Take 1 Cap by mouth daily. Class: Normal  
 Pharmacy: [address] [place] [address], [address] [place] [address] Ph #: [phone] Route: Oral  
  
We Performed the Following CBC WITH AUTOMATED DIFF [87389 CPT(R)] [facility] AND LH [27713 CPT(R)] LIPID PANEL [12432 CPT(R)] METABOLIC PANEL, COMPREHENSIVE [29440 CPT(R)] THYROID PANEL [id number] CPT(R)] TSH 3RD GENERATION [03004 CPT(R)] VITAMIN D, 25 HYDROXY [id number] CPT(R)] Follow-up Instructions Return in about 8 weeks (around 3/13/2018) for Follow-up Anxiety. Patient Instructions It was a pleasure to meet you! As discussed: 
 
 
I have ordered your age appropriate labs please complete them. You will need to fast 10-12hrs before your lab appointment. Complete labs two weeks before your next appointment. Weight Management You are considering joining  the [employer] Medically Supervised [place] Loss Program. I have given you information regarding this exciting program. Please call [phone] if you have not received a call within 48- 72hrs. Recommended \"Diets\" Choose a healthy eating plan that works best for you. Some ideas are: 
Whole 30 Diet: http://whole30.com/ 
Mediterranean Diet: ResidentialBook.de Low Carb Diet: "[employer].nl Fast Metabolism Diet: http://[employer]e group. Digicompanion/books/the-fast-metabolism-diet/ 
I Anxiety I have prescribed Effexor. It may make your symptoms worse int the first 2 weeks before improving your symptoms. Do [place] We can increase the medication as needed for your symptoms. Please allow 2-4 weeks to see a difference. I have also ordered labs to check for medical causes. If you have any thoughts of harming yourself or others please seek immediate medical attention. If you have non urgent concerns, feel free to contact the office                  When should you call for help? Call 911 anytime you think you may need emergency care. For example, call if: 
· You feel you cannot stop from hurting yourself or someone else. Call your doctor now or seek immediate medical care if: 
· You hear voices. · You feel much more depressed. Well Visit, Women 48 to 72: Care Instructions Your Care Instructions Physical exams can help you stay healthy. Your doctor has checked your overall health and may have suggested ways to take good care of yourself. He or she also may have recommended tests. At home, you can help prevent illness with healthy eating, regular exercise, and other steps. Follow-up care is a key part of your treatment and safety. Be sure to make and go to all appointments, and call your doctor if you are having problems. It's also a good idea to know your test results and keep a list of the medicines you take. How can you care for yourself at home? · Reach and stay at a healthy weight. This will lower your risk for many problems, such as obesity, diabetes, heart disease, and high blood pressure. · Get at least 30 minutes of exercise on most days of the week. Walking is a good choice. You also may want to do other activities, such as running, swimming, cycling, or playing tennis or team sports. · Do not smoke. Smoking can make health problems worse. If you need help quitting, talk to your doctor about stop-smoking programs and medicines. These can increase your chances of quitting for good. · Protect your skin from too much sun. When you're outdoors from 10 a.m. to 4 p.m., stay in the shade or cover up with clothing and a hat with a wide brim. Wear sunglasses that block UV rays. Even when it's cloudy, put broad-spectrum sunscreen (SPF 30 or higher) on any exposed skin. · See a dentist one or two times a year for checkups and to have your teeth cleaned. · Wear a seat belt in the car. · Limit alcohol to 1 drink a day. Too much alcohol can cause health problems. Follow your doctor's advice about when to have certain tests. These tests can spot problems early. · Cholesterol. Your doctor will tell you how often to have this done based on your age, family history, or other things that can increase your risk for heart attack and stroke. · Blood pressure. Have your blood pressure checked during a routine doctor visit. Your doctor will tell you how often to check your blood pressure based on your age, your blood pressure results, and other factors. · Mammogram. Ask your doctor how often you should have a mammogram, which is an X-ray of your breasts. A mammogram can spot breast cancer before it can be felt and when it is easiest to treat. · Pap test and pelvic exam. Ask your doctor how often you should have a Pap test. You may not need to have a Pap test as often as you used to. · Vision.  Have your eyes checked every year or two or as often as your doctor suggests. Some experts recommend that you have yearly exams for glaucoma and other age-related eye problems starting at age 48. · Hearing. Tell your doctor if you notice any change in your hearing. You can have tests to find out how well you hear. · Diabetes. Ask your doctor whether you should have tests for diabetes. · Colon cancer. You should begin tests for colon cancer at age 48. You may have one of several tests. Your doctor will tell you how often to have tests based on your age and risk. Risks include whether you already had a precancerous polyp removed from your colon or whether your parents, sisters and brothers, or children have had colon cancer. · Thyroid disease. Talk to your doctor about whether to have your thyroid checked as part of a regular physical exam. Women have an increased chance of a thyroid problem. · Osteoporosis. You should begin tests for bone density at age 72. If you are younger than 72, ask your doctor whether you have factors that may increase your risk for this disease. You may want to have this test before age 72. · Heart attack and stroke risk. At least every 4 to 6 years, you should have your risk for heart attack and stroke assessed. Your doctor uses factors such as your age, blood pressure, cholesterol, and whether you smoke or have diabetes to show what your risk for a heart attack or stroke is over the next 10 years. When should you call for help? Watch closely for changes in your health, and be sure to contact your doctor if you have any problems or symptoms that concern you. Where can you learn more? Go to http://regina-angel.info/. Enter H895 in the search box to learn more about \"Well Visit, Women 50 to 72: Care Instructions. \" Current as of: May 12, 2017 Content Version: 11.4 © 9993-5809 [employer].  Care instructions adapted under license by G[employer] (which disclaims liability or warranty for this information). If you have questions about a medical condition or this instruction, always ask your healthcare professional. [address] any warranty or liability for your use of this information. Introducing [facility] SERVICES! [employer] introduces [employer] patient portal. Now you can access parts of your medical record, email your doctor's office, and request medication refills online. 1. In your internet browser, go to [email or web address] 2. Click on the First Time User? Click Here link in the Sign In box. You will see the New Member Sign Up page. 3. Enter your B[employer]q Access Code exactly as it appears below. You will not need to use this code after youve completed the sign-up process. If you do not sign up before the expiration date, you must request a new code. · [employer] Access Code: [email or web address] Expires: 2/17/2018 12:58 AM 
 
4. Enter the last four digits of your Social Security Number (xxxx) and Date of Birth (mm/dd/yyyy) as indicated and click Submit. You will be taken to the next sign-up page. 5. Create a Beneq ID. This will be your Beneq login ID and cannot be changed, so think of one that is secure and easy to remember. 6. Create a Beneq password. You can change your password at any time. 7. Enter your Password Reset Question and Answer. This can be used at a later time if you forget your password. 8. Enter your e-mail address. You will receive e-mail notification when new information is available in [address] 9. Click Sign Up. You can now view and download portions of your medical record. 10. Click the Download Summary menu link to download a portable copy of your medical information. If you have questions, please visit the Frequently Asked Questions section of the Beneq website. Remember, B[employer]q is NOT to be used for urgent needs. For medical emergencies, dial 911. Now available from your iPhone and Android! Please provide this summary of care documentation to your next provider. Your primary care clinician is listed as [facility]. If you have any questions after today's visit, please call [phone].

## 2018-01-16 NOTE — PROGRESS NOTES
HISTORY OF PRESENT ILLNESS  Pasquale Eaton is a 50 y.o. female. HPI  New Patient  Pasquale Eaton is a new patient. Prior care:  She has not had a PCP. Has been seeing her gynecologist for primary care. Her gynecologist Dr. Frank Rodriguez, [facility]. Records have been requested. ER Visits/ Hospitalizations:     Health Maintenance  Breast Cancer screening: Up to date, completed by gynecologist.    Colorectal Cancer screenin17 wnl 10yrs   Cervical Cancer screening: s/p hysterectomy  Up to date, completed by gynecologist.        Hypothyroidism  Patient is seen by  Noemy Chambers NP at [facility]  for followup of hypothyroidism 2/2 papillary tyroid cancer age 24. Mary Lou Spencer Has been seen in the past  6 months and had TSH checked. Compliant with levothyroxine. Thyroid ROS: + fatigue, weight changes, heat/cold intolerance, bowel/skin changes or CVS symptoms. Cardiovascular Review:  The patient has venous insufficiency and obesity Body mass index is 32.56 kg/(m^2). Diet and Lifestyle: exercises regularly, nonsmoker  Home BP Monitoring: is not measured at home. Pertinent ROS: taking medications as instructed, no medication side effects noted, no TIA's, no chest pain on exertion, no dyspnea on exertion, + swelling of ankles uses HCTZ prn 2-3x/ weeks     Anxiety  Patient is seen for followup of Anxiety. She has been treated in the past with venlafaxine. she reports no suicidal thoughts with specific plan. she experiences the following side effects from the treatment: none. PHQ over the last two weeks 2018   Little interest or pleasure in doing things Not at all   Feeling down, depressed or hopeless Not at all   Total Score PHQ 2 0         SHx: 5 children, ages 13- 18; Moved to [facility] from [place]. Works with . Review of Systems   Constitutional: Negative for chills, fever and weight loss. HENT: Negative for congestion and sore throat. Eyes: Negative for blurred vision and double vision.    Respiratory: Negative for cough and shortness of breath. Cardiovascular: Negative for chest pain, orthopnea and leg swelling. Gastrointestinal: Negative for abdominal pain, blood in stool, constipation, diarrhea, heartburn, nausea and vomiting. Genitourinary: Positive for frequency. Negative for urgency. Has seen Barbara Chandra in past    Musculoskeletal: Negative for joint pain and myalgias. Neurological: Negative for dizziness, tremors, weakness and headaches. Endo/Heme/Allergies: Does not bruise/bleed easily. Psychiatric/Behavioral: Negative for depression and suicidal ideas. There are no active problems to display for this patient. Current Outpatient Prescriptions   Medication Sig Dispense Refill    SYNTHROID 150 mcg tablet TAKE 1 TABLET EVERY DAY  2    hydroCHLOROthiazide (MICROZIDE) 12.5 mg capsule Take 12.5 mg by mouth daily. Allergies   Allergen Reactions    Azithromycin Other (comments)     Upset stomach      Visit Vitals    BP 94/64 (BP 1 Location: Right arm, BP Patient Position: Sitting)    Pulse 68    Temp 97.3 °F (36.3 °C) (Oral)    Resp 14    Ht 5' 3.78\" (1.62 m)    Wt 188 lb 6.4 oz (85.5 kg)    SpO2 98%    BMI 32.56 kg/m2       Physical Exam   Constitutional: She is oriented to person, place, and time. She appears well-developed and well-nourished. HENT:   Right Ear: External ear normal.   Left Ear: External ear normal.   Mouth/Throat: Oropharynx is clear and moist. No oropharyngeal exudate. Eyes: Conjunctivae are normal. No scleral icterus. Neck: Normal range of motion. Neck supple. No thyromegaly present. Cardiovascular: Normal rate, regular rhythm and normal heart sounds. Exam reveals no gallop and no friction rub. No murmur heard. Pulmonary/Chest: Effort normal and breath sounds normal. No respiratory distress. She has no wheezes. She has no rales. She exhibits no tenderness. Abdominal: Soft. Bowel sounds are normal. She exhibits no distension.  There is no tenderness. There is no rebound and no guarding. Genitourinary: Rectal exam shows guaiac negative stool. Genitourinary Comments: Deferred for gyn per pt request   Musculoskeletal: Normal range of motion. She exhibits no edema or tenderness. Neurological: She is alert and oriented to person, place, and time. ASSESSMENT and PLAN  Diagnoses and all orders for this visit:    1. Well woman exam (no gynecological exam)- overdue to see PCP. Labs ordered. .  -     CBC WITH AUTOMATED DIFF  -     LIPID PANEL  -     METABOLIC PANEL, COMPREHENSIVE  -     VITAMIN D, 25 HYDROXY  -     TSH 3RD GENERATION  -     THYROID PANEL  -     FSH AND LH    2. Anxiety- recurrent. Resume effexor. Optimize stress management. Patient Education:  Reviewed concept of anxiety as biochemical imbalance of neurotransmitters and rationale for treatment. Instructed patient to contact office or 911 promptly should condition worsen or any new symptoms appear and provided on-call telephone numbers. IF THE PATIENT HAS ANY SUICIDAL OR HOMICIDAL IDEATION, CALL THE OFFICE, DISCUSS WITH A SUPPORT MEMBER OR GO TO THE ER IMMEDIATELY. Patient was agreeable with this    -     venlafaxine-[facility] 75 mg capsule; Take 1 Cap by mouth daily. 3. H/O papillary adenocarcinoma of thyroid- per [facility] endocrinology     4. Postoperative hypothyroidism- check TFTs now given weight gain. If abnl forward to [facility] endo    5. Stasis edema of both lower extremities- has been using HCTZ prn. Will address symptomatic relief. -     hydroCHLOROthiazide (MICROZIDE) 12.5 mg capsule; Take 1 Cap by mouth daily as needed. For severe edema    6. Numerous moles- Mole evaluation  Please schedule an appointment with dermatology (list provided) to evaluate your moles. Follow-up Disposition:  Return in about 8 weeks (around 3/13/2018) for Follow-up Anxiety. Medication risks/benefits/costs/interactions/alternatives discussed with patient.   Nasir Gilbert  was given an after visit summary which includes diagnoses, current medications, & vitals. she expressed understanding with the diagnosis and plan.

## 2018-01-16 NOTE — PATIENT INSTRUCTIONS
It was a pleasure to meet you! As discussed:      I have ordered your age appropriate labs please complete them. You will need to fast 10-12hrs before your lab appointment. Complete labs two weeks before your next appointment. Weight Management  You are considering joining  the [facility] Medically Supervised Weight Loss Program. I have given you information regarding this exciting program. Please call [phone] if you have not received a call within 48- 72hrs. Recommended \"Diets\"  Choose a healthy eating plan that works best for you. Some ideas are:  Whole 30 Diet: http://whole30.com/  Mediterranean Diet: ResidentialBook.de  Low Carb Diet: [email or web address].nl  Fast Metabolism Diet: http://Skype. Teabox/books/the-fast-metabolism-diet/  I  Anxiety  I have prescribed Effexor. It may make your symptoms worse int the first 2 weeks before improving your symptoms. .    We can increase the medication as needed for your symptoms. Please allow 2-4 weeks to see a difference. I have also ordered labs to check for medical causes. If you have any thoughts of harming yourself or others please seek immediate medical attention. If you have non urgent concerns, feel free to contact the office                  When should you call for help? Call 911 anytime you think you may need emergency care. For example, call if:  · You feel you cannot stop from hurting yourself or someone else. Call your doctor now or seek immediate medical care if:  · You hear voices. · You feel much more depressed. Well Visit, Women 48 to 72: Care Instructions  Your Care Instructions    Physical exams can help you stay healthy. Your doctor has checked your overall health and may have suggested ways to take good care of yourself.  He or she also may have recommended tests. At home, you can help prevent illness with healthy eating, regular exercise, and other steps. Follow-up care is a key part of your treatment and safety. Be sure to make and go to all appointments, and call your doctor if you are having problems. It's also a good idea to know your test results and keep a list of the medicines you take. How can you care for yourself at home? · Reach and stay at a healthy weight. This will lower your risk for many problems, such as obesity, diabetes, heart disease, and high blood pressure. · Get at least 30 minutes of exercise on most days of the week. Walking is a good choice. You also may want to do other activities, such as running, swimming, cycling, or playing tennis or team sports. · Do not smoke. Smoking can make health problems worse. If you need help quitting, talk to your doctor about stop-smoking programs and medicines. These can increase your chances of quitting for good. · Protect your skin from too much sun. When you're outdoors from 10 a.m. to 4 p.m., stay in the shade or cover up with clothing and a hat with a wide brim. Wear sunglasses that block UV rays. Even when it's cloudy, put broad-spectrum sunscreen (SPF 30 or higher) on any exposed skin. · See a dentist one or two times a year for checkups and to have your teeth cleaned. · Wear a seat belt in the car. · Limit alcohol to 1 drink a day. Too much alcohol can cause health problems. Follow your doctor's advice about when to have certain tests. These tests can spot problems early. · Cholesterol. Your doctor will tell you how often to have this done based on your age, family history, or other things that can increase your risk for heart attack and stroke. · Blood pressure. Have your blood pressure checked during a routine doctor visit. Your doctor will tell you how often to check your blood pressure based on your age, your blood pressure results, and other factors.   · Mammogram. Ask your doctor how often you should have a mammogram, which is an X-ray of your breasts. A mammogram can spot breast cancer before it can be felt and when it is easiest to treat. · Pap test and pelvic exam. Ask your doctor how often you should have a Pap test. You may not need to have a Pap test as often as you used to. · Vision. Have your eyes checked every year or two or as often as your doctor suggests. Some experts recommend that you have yearly exams for glaucoma and other age-related eye problems starting at age 48. · Hearing. Tell your doctor if you notice any change in your hearing. You can have tests to find out how well you hear. · Diabetes. Ask your doctor whether you should have tests for diabetes. · Colon cancer. You should begin tests for colon cancer at age 48. You may have one of several tests. Your doctor will tell you how often to have tests based on your age and risk. Risks include whether you already had a precancerous polyp removed from your colon or whether your parents, sisters and brothers, or children have had colon cancer. · Thyroid disease. Talk to your doctor about whether to have your thyroid checked as part of a regular physical exam. Women have an increased chance of a thyroid problem. · Osteoporosis. You should begin tests for bone density at age 72. If you are younger than 72, ask your doctor whether you have factors that may increase your risk for this disease. You may want to have this test before age 72. · Heart attack and stroke risk. At least every 4 to 6 years, you should have your risk for heart attack and stroke assessed. Your doctor uses factors such as your age, blood pressure, cholesterol, and whether you smoke or have diabetes to show what your risk for a heart attack or stroke is over the next 10 years. When should you call for help? Watch closely for changes in your health, and be sure to contact your doctor if you have any problems or symptoms that concern you.   Where can you learn more? Go to http://regina-angel.info/. Enter K302 in the search box to learn more about \"Well Visit, Women 50 to 72: Care Instructions. \"  Current as of: May 12, 2017  Content Version: 11.4  © 6831-1052 Healthwise, Incorporated. Care instructions adapted under license by [employer] (which disclaims liability or warranty for this information). If you have questions about a medical condition or this instruction, always ask your healthcare professional. [address] any warranty or liability for your use of this information.

## 2018-01-16 NOTE — PROGRESS NOTES
Chief Complaint   Patient presents with   [facility] Care     1. Have you been to the ER, urgent care clinic since your last visit? Hospitalized since your last visit? Yes Where: ER [facility] Reason for visit: Numbness left hand/palpitations 11/2017    2. Have you seen or consulted any other health care providers outside of the [address] [place] [address] since your last visit? Include any pap smears or colon screening.  Yes Where: [facility] C[facility]-Endocrinologist Reason for visit: Follow up

## 2018-01-17 LAB
25(OH)D3+25(OH)D2 SERPL-MCNC: 28 NG/ML (ref 30–100)
ALBUMIN SERPL-MCNC: 4.4 G/DL (ref 3.5–5.5)
ALBUMIN/GLOB SERPL: 2.1 {RATIO} (ref 1.2–2.2)
ALP SERPL-CCNC: 51 IU/L (ref 39–117)
ALT SERPL-CCNC: 14 IU/L (ref 0–32)
AST SERPL-CCNC: 15 IU/L (ref 0–40)
BASOPHILS # BLD AUTO: 0 X10E3/UL (ref 0–0.2)
BASOPHILS NFR BLD AUTO: 1 %
BILIRUB SERPL-MCNC: 0.4 MG/DL (ref 0–1.2)
BUN SERPL-MCNC: 16 MG/DL (ref 6–24)
BUN/CREAT SERPL: 21 (ref 9–23)
CALCIUM SERPL-MCNC: 9.2 MG/DL (ref 8.7–10.2)
CHLORIDE SERPL-SCNC: 99 MMOL/L (ref 96–106)
CHOLEST SERPL-MCNC: 248 MG/DL (ref 100–199)
CO2 SERPL-SCNC: 26 MMOL/L (ref 18–29)
CREAT SERPL-MCNC: 0.75 MG/DL (ref 0.57–1)
EOSINOPHIL # BLD AUTO: 0.1 X10E3/UL (ref 0–0.4)
EOSINOPHIL NFR BLD AUTO: 2 %
ERYTHROCYTE [DISTWIDTH] IN BLOOD BY AUTOMATED COUNT: 13.1 % (ref 12.3–15.4)
EST. AVERAGE GLUCOSE BLD GHB EST-MCNC: 105 MG/DL
FSH SERPL-ACNC: 57.6 MIU/ML
FT4I SERPL CALC-MCNC: 2.4 (ref 1.2–4.9)
GLOBULIN SER CALC-MCNC: 2.1 G/DL (ref 1.5–4.5)
GLUCOSE SERPL-MCNC: 96 MG/DL (ref 65–99)
HBA1C MFR BLD: 5.3 % (ref 4.8–5.6)
HCT VFR BLD AUTO: 40.9 % (ref 34–46.6)
HDLC SERPL-MCNC: 82 MG/DL
HGB BLD-MCNC: 13.4 G/DL (ref 11.1–15.9)
IMM GRANULOCYTES # BLD: 0 X10E3/UL (ref 0–0.1)
IMM GRANULOCYTES NFR BLD: 0 %
LDLC SERPL CALC-MCNC: 153 MG/DL (ref 0–99)
LH SERPL-ACNC: 40.7 MIU/ML
LYMPHOCYTES # BLD AUTO: 2.6 X10E3/UL (ref 0.7–3.1)
LYMPHOCYTES NFR BLD AUTO: 49 %
MCH RBC QN AUTO: 30.4 PG (ref 26.6–33)
MCHC RBC AUTO-ENTMCNC: 32.8 G/DL (ref 31.5–35.7)
MCV RBC AUTO: 93 FL (ref 79–97)
MONOCYTES # BLD AUTO: 0.3 X10E3/UL (ref 0.1–0.9)
MONOCYTES NFR BLD AUTO: 5 %
NEUTROPHILS # BLD AUTO: 2.2 X10E3/UL (ref 1.4–7)
NEUTROPHILS NFR BLD AUTO: 43 %
PLATELET # BLD AUTO: 221 X10E3/UL (ref 150–379)
POTASSIUM SERPL-SCNC: 4.4 MMOL/L (ref 3.5–5.2)
PROT SERPL-MCNC: 6.5 G/DL (ref 6–8.5)
RBC # BLD AUTO: 4.41 X10E6/UL (ref 3.77–5.28)
SODIUM SERPL-SCNC: 140 MMOL/L (ref 134–144)
T3RU NFR SERPL: 32 % (ref 24–39)
T4 SERPL-MCNC: 7.6 UG/DL (ref 4.5–12)
TRIGL SERPL-MCNC: 67 MG/DL (ref 0–149)
TSH SERPL DL<=0.005 MIU/L-ACNC: 0.25 UIU/ML (ref 0.45–4.5)
VLDLC SERPL CALC-MCNC: 13 MG/DL (ref 5–40)
WBC # BLD AUTO: 5.3 X10E3/UL (ref 3.4–10.8)

## 2018-02-08 ENCOUNTER — TELEPHONE (OUTPATIENT)
Dept: INTERNAL MEDICINE CLINIC | Age: 53
End: 2018-02-08

## 2018-02-08 NOTE — TELEPHONE ENCOUNTER
[phone] pt would like a call back with lab results, she also has questions regarding weight loss with dr Khalif Herrera.

## 2018-02-19 ENCOUNTER — TELEPHONE (OUTPATIENT)
Dept: INTERNAL MEDICINE CLINIC | Age: 53
End: 2018-02-19

## 2018-02-19 NOTE — TELEPHONE ENCOUNTER
Left message to return call regarding her lab results, and recs' for a follow up appt in March to discuss, pt tp schedule with endocrine at at [facility]

## 2018-02-19 NOTE — PROGRESS NOTES
Please let patient know that her labs show a few abnormalities none are urgent however I would like her to schedule an appointment in March as we discussed to discuss the results and next steps. Her last appointment we discussed her scheduling with [facility] Endocrinology again has she done this?   A letter will be mailed with the results

## 2018-02-22 NOTE — PROGRESS NOTES
Patient has been informed per drs result notes and recs' pt verbalizes understanding . She will back to schedule an appt for march.  She also inquired of weight loss program , number was given

## 2018-03-27 ENCOUNTER — OFFICE VISIT (OUTPATIENT)
Dept: BARIATRICS/WEIGHT MGMT | Age: 53
End: 2018-03-27

## 2018-03-27 DIAGNOSIS — E66.09 CLASS 1 OBESITY DUE TO EXCESS CALORIES WITH BODY MASS INDEX (BMI) OF 32.0 TO 32.9 IN ADULT, UNSPECIFIED WHETHER SERIOUS COMORBIDITY PRESENT: Primary | ICD-10-CM

## 2018-04-03 ENCOUNTER — TELEPHONE (OUTPATIENT)
Dept: INTERNAL MEDICINE CLINIC | Age: 53
End: 2018-04-03

## 2018-04-03 DIAGNOSIS — F41.9 ANXIETY: ICD-10-CM

## 2018-04-03 RX ORDER — VENLAFAXINE HYDROCHLORIDE 75 MG/1
CAPSULE, EXTENDED RELEASE ORAL
Qty: 30 CAP | Refills: 1 | Status: SHIPPED | OUTPATIENT
Start: 2018-04-03 | End: 2018-08-03 | Stop reason: SDUPTHER

## 2018-04-03 NOTE — TELEPHONE ENCOUNTER
She has not scheduled a follow-up appointment.   If this medication was effective will call in 30 days with one refill she needs to schedule follow-up.

## 2018-05-15 ENCOUNTER — OFFICE VISIT (OUTPATIENT)
Dept: INTERNAL MEDICINE CLINIC | Age: 53
End: 2018-05-15

## 2018-05-15 VITALS
RESPIRATION RATE: 14 BRPM | HEIGHT: 64 IN | WEIGHT: 194.4 LBS | BODY MASS INDEX: 33.19 KG/M2 | HEART RATE: 74 BPM | OXYGEN SATURATION: 97 % | TEMPERATURE: 97.6 F | DIASTOLIC BLOOD PRESSURE: 64 MMHG | SYSTOLIC BLOOD PRESSURE: 90 MMHG

## 2018-05-15 DIAGNOSIS — F41.9 ANXIETY: ICD-10-CM

## 2018-05-15 DIAGNOSIS — E66.9 OBESITY (BMI 30.0-34.9): ICD-10-CM

## 2018-05-15 DIAGNOSIS — R63.5 WEIGHT GAIN: Primary | ICD-10-CM

## 2018-05-15 DIAGNOSIS — L98.8 SKIN LESION OF BREAST: ICD-10-CM

## 2018-05-15 DIAGNOSIS — I87.303 STASIS EDEMA OF BOTH LOWER EXTREMITIES: ICD-10-CM

## 2018-05-15 NOTE — PROGRESS NOTES
HISTORY OF PRESENT ILLNESS  Talya Vargas is a 48 y.o. female. HPI  Anxiety  Patient is seen for anxiety disorder. Current treatment includes Effexor and no other therapies. Ongoing symptoms include: none. Patient denies: paresthesias, insomnia, suicidal ideation, homocidal ideation. Reported side effects from the treatment: none. Cardiovascular Review:  The patient has Body mass index is 33.6 kg/(m^2). Has gained 25lbs in 1 year. Diet and Lifestyle: exercises regularly, nonsmoker, alcohol intake <7 drinks/ week , Started vegan diet in March   Home BP Monitoring: is not measured at home. Pertinent ROS: no TIA's, no chest pain on exertion, no dyspnea on exertion, no swelling of ankles. Hypothyroidism  Patient is seen for followup of hypothyroidism by [facility]. Next appt July 2018. Thyroid ROS: denies fatigue, weight changes, heat/cold intolerance, bowel/skin changes or CVS symptoms. Review of Systems   Constitutional: Negative for diaphoresis, fever and weight loss. Eyes: Negative for blurred vision and pain. Respiratory: Negative for shortness of breath. Cardiovascular: Negative for chest pain, orthopnea and leg swelling. Neurological: Negative for focal weakness and headaches. Psychiatric/Behavioral: Negative for depression. Physical Exam   Constitutional: She is oriented to person, place, and time. She appears well-developed. No distress. Eyes: Conjunctivae are normal.   Neck: Neck supple. Cardiovascular: Normal rate, regular rhythm and normal heart sounds. Pulmonary/Chest: Effort normal and breath sounds normal. No respiratory distress. She has no wheezes. She has no rales. She exhibits no tenderness. Neurological: She is alert and oriented to person, place, and time. Skin: Skin is warm. Psychiatric: She has a normal mood and affect.      Lab Results  Component Value Date/Time   WBC 5.3 01/16/2018 12:00 AM   HGB 13.4 01/16/2018 12:00 AM   HCT 40.9 01/16/2018 12:00 AM   PLATELET 713 35/69/3826 12:00 AM   MCV 93 01/16/2018 12:00 AM     Lab Results  Component Value Date/Time   Hemoglobin A1c 5.3 01/16/2018 12:00 AM   Glucose 96 01/16/2018 12:00 AM   LDL, calculated 153 (H) 01/16/2018 12:00 AM   Creatinine 0.75 01/16/2018 12:00 AM      Lab Results  Component Value Date/Time   Cholesterol, total 248 (H) 01/16/2018 12:00 AM   HDL Cholesterol 82 01/16/2018 12:00 AM   LDL, calculated 153 (H) 01/16/2018 12:00 AM   Triglyceride 67 01/16/2018 12:00 AM     Lab Results  Component Value Date/Time   ALT (SGPT) 14 01/16/2018 12:00 AM   AST (SGOT) 15 01/16/2018 12:00 AM   Alk. phosphatase 51 01/16/2018 12:00 AM   Bilirubin, total 0.4 01/16/2018 12:00 AM   Albumin 4.4 01/16/2018 12:00 AM   Protein, total 6.5 01/16/2018 12:00 AM   PLATELET 577 55/81/3754 12:00 AM       Lab Results  Component Value Date/Time   GFR est non-AA 93 01/16/2018 12:00 AM   GFR est  01/16/2018 12:00 AM   Creatinine 0.75 01/16/2018 12:00 AM   BUN 16 01/16/2018 12:00 AM   Sodium 140 01/16/2018 12:00 AM   Potassium 4.4 01/16/2018 12:00 AM   Chloride 99 01/16/2018 12:00 AM   CO2 26 01/16/2018 12:00 AM     Lab Results  Component Value Date/Time   TSH 0.246 (L) 01/16/2018 12:00 AM   T3 Uptake 32 01/16/2018 12:00 AM   T4, Total 7.6 01/16/2018 12:00 AM      Lab Results   Component Value Date/Time    Glucose 96 01/16/2018 12:00 AM         ASSESSMENT and PLAN  Diagnoses and all orders for this visit:    1. Weight gainwill likely need weight loss medication given she has multiple contributing factors to her obesity state. Has been given a list of weight loss medications to check with her insurance for coverage. In the interim she will see a nutritionist to make sure her diet is optimal.  She will also keep a food log and continue her exercise practices.   Weight Management Plan & Reflection  Review Weight Loss Medication List   Keep a Food Log   See [address]ANDOL[address]  Nutrition [phone]      2. Anxietystable. Continue Effexor. Patient Education:  Reviewed concept of anxiety as biochemical imbalance of neurotransmitters and rationale for treatment. Instructed patient to contact office or 911 promptly should condition worsen or any new symptoms appear and provided on-call telephone numbers. IF THE PATIENT HAS ANY SUICIDAL OR HOMICIDAL IDEATION, CALL THE OFFICE, DISCUSS WITH A SUPPORT MEMBER OR GO TO THE ER IMMEDIATELY. Patient was agreeable with this      3. Stasis edema of both lower extremitiesstable continue as needed hydrochlorothiazide    4. Skin lesion of breast need to rule out malignant skin lesion. Given the acuity this seems less likely. She has a appointment with dermatology scheduled. If that evaluation does not lead to a skin biopsy then we will have her see breast surgery for additional evaluation    5. Obesity (BMI 30.0-34.9)see above  -     REFERRAL TO NUTRITION      Follow-up Disposition:  Return in about 4 weeks (around 6/12/2018) for Follow-up, Weight Management. Medication risks/benefits/costs/interactions/alternatives discussed with patient. Jeferson Christopher  was given an after visit summary which includes diagnoses, current medications, & vitals. she expressed understanding with the diagnosis and plan.

## 2018-05-15 NOTE — MR AVS SNAPSHOT
[address] [place] [address] [address] [place] [address] 
[phone] Patient: Mila Vargas MRN: [id number] Visit Information Date & Time Provider Department Dept. Phone Encounter #  
 5/15/2018  3:30 PM Eliza Theodore MD [address] [address] Internal Medicine [phone] [id number] Follow-up Instructions Return in about 4 weeks (around 6/12/2018) for Follow-up, Weight Management. Upcoming Health Maintenance Date Due  
 BREAST CANCER SCRN MAMMOGRAM 3/17/2018 PAP AKA CERVICAL CYTOLOGY 7/16/2018 Influenza Age 5 to Adult 8/1/2018 DTaP/Tdap/Td series (2 - Td) 5/14/2026 COLONOSCOPY 11/17/2027 Allergies as of 5/15/2018  Review Complete On: 5/15/2018 By: Eliza Theodore MD  
  
 Severity Noted Reaction Type Reactions Azithromycin  01/16/2018    Other (comments) Upset stomach Current Immunizations  Never Reviewed Name Date Tdap 5/14/2016  5:40 PM  
  
 Not reviewed this visit You Were Diagnosed With   
  
 Codes Comments Weight gain    -  Primary ICD-10-CM: R63.5 ICD-9-CM: 783.1 Anxiety     ICD-10-CM: F41.9 ICD-9-CM: 300.00 Stasis edema of both lower extremities     ICD-10-CM: I87.303 ICD-9-CM: 459.30 Skin lesion of breast     ICD-10-CM: N64.9 ICD-9-CM: 611.9 Obesity (BMI 30.0-34.9)     ICD-10-CM: K25.1 ICD-9-CM: 278.00 Vitals BP Pulse Temp Resp Height(growth percentile) Weight(growth percentile) 90/64 (BP 1 Location: Right arm, BP Patient Position: Sitting) 74 97.6 °F (36.4 °C) (Oral) 14 5' 3.78\" (1.62 m) 194 lb 6.4 oz (88.2 kg) SpO2 BMI OB Status Smoking Status 97% 33.6 kg/m2 Hysterectomy Never Smoker Vitals History BMI and BSA Data Body Mass Index Body Surface Area  
 33.6 kg/m 2 1.99 m 2 Preferred Pharmacy Pharmacy Name Phone [id number] [phone] Your Updated Medication List  
  
   
This list is accurate as of 5/15/18  4:16 PM.  Always use your most recent med list.  
  
  
  
  
 hydroCHLOROthiazide 12.5 mg capsule Commonly known as:  Mennie Matter Take 1 Cap by mouth daily as needed. For severe edema SYNTHROID 150 mcg tablet Generic drug:  levothyroxine TAKE 1 TABLET EVERY DAY  
  
 venlafaxine-SR 75 mg capsule Commonly known as:  EFFEXOR-XR  
TAKE 1 CAPSULE BY MOUTH EVERY DAY We Performed the Following REFERRAL TO NUTRITION [REF50 Custom] Comments:  
 Please evaluate patient for weight management Follow-up Instructions Return in about 4 weeks (around 6/12/2018) for Follow-up, Weight Management. Referral Information Referral ID Referred By Referred To  
  
 [id number] Carole Florian   
   [address] [place] [address],[address] [facility], [place] Final Phone: [phone] Visits Status Start Date End Date 1 New Request 5/15/18 5/15/19 If your referral has a status of pending review or denied, additional information will be sent to support the outcome of this decision. Patient Instructions It was a pleasure to see you! As discussed: 
 
Weight Management Plan & Reflection Review Weight Loss Medication List  
Keep a Food Log See Nutritionist  
[facility] [phone] Weight History Reflection Complete Weight History Reflection- What was going on in your life at time during each weight? Period of weight gain Weight loss? Lifestyle Reflection What foods/ drinks are most difficult for you to give up? Why? How much exercise are getting? Why? How much water are you drinking? Why? How many hours do you sleep at night? Why? Any other reflections on your weight journey? Learning About Healthy Weight What is a healthy weight?  
A healthy weight is the weight at which you feel good about yourself and have energy for work and play. It's also one that lowers your risk for health problems. What can you do to stay at a healthy weight? It can be hard to stay at a healthy weight, especially when fast food, vending-machine snacks, and processed foods are so easy to find. And with your busy lifestyle, activity may be low on your list of things to do. But staying at a healthy weight may be easier than you think. Here are some dos and don'ts for staying at a healthy weight: 
Do eat healthy foods The kinds of foods you eat have a big impact on both your weight and your health. Reaching and staying at a healthy weight is not about going on a diet. It's about making healthier food choices every day and changing your diet for good. Healthy eating means eating a variety of foods so that you get all the nutrients you need. Your body needs protein, carbohydrate, and fats for energy. They keep your heart beating, your brain active, and your muscles working. On most days, try to eat from each food group. This means eating a variety of: · Whole grains, such as whole wheat breads and pastas. · Fruits and vegetables. · Dairy products, such as low-fat milk, yogurt, and cheese. · Lean proteins, such as all types of fish, chicken without the skin, and beans. Don't have too much or too little of one thing. All foods, if eaten in moderation, can be part of healthy eating. Even sweets can be okay. If your favorite foods are high in fat, salt, sugar, or calories, limit how often you eat them. Eat smaller servings, or look for healthy substitutes. Do watch what you eat Many people eat more than their bodies need. Part of staying at a healthy weight means learning how much food you really need from day to day and not eating more than that. Even with healthy foods, eating too much can make you gain weight.  
Having a well-balanced diet means that you eat enough, but not too much, and that your food gives you the nutrients you need to stay healthy. So listen to your body. Eat when you're hungry. Stop when you feel satisfied. It's a good idea to have healthy snacks ready for when you get hungry. Keep healthy snacks with you at work, in your car, and at home. If you have a healthy snack easily available, you'll be less likely to pick a candy bar or bag of chips from a vending machine instead. Some healthy snacks you might want to keep on hand are fruit, low-fat yogurt, string cheese, low-fat microwave popcorn, raisins and other dried fruit, nuts, whole wheat crackers, pretzels, carrots, celery sticks, and broccoli. Do some physical activity A big part of reaching and staying at a healthy weight is being active. When you're active, you burn calories. This makes it easier to reach and stay at a healthy weight. When you're active on a regular basis, your body burns more calories, even when you're at rest. Being active helps you lose fat and build lean muscle. Try to be active for at least 1 hour every day. This may sound like a lot, but it's okay to be active in smaller blocks of time that add up to 1 hour a day. Any activity that makes your heart beat faster and keeps it there for a while counts. A brisk walk, run, or swim will get your heart beating faster. So will climbing stairs, shooting baskets, or cycling. Even some household chores like vacuuming and mowing the lawn will get your heart rate up. Pick activities that you enjoy-ones that make your heart beat faster, your muscles stronger, and your muscles and joints more flexible. If you find more than one thing you like doing, do them all. You don't have to do the same thing every day. Don't diet Diets don't work. Diets are temporary. Because you give up so much when you diet, you may be hungry and think about food all the time.  And after you stop dieting, you also may overeat to make up for what you missed. Most people who diet end up gaining back the pounds they lost-and more. Remember that healthy bodies come in lots of shapes and sizes. Everyone can get healthier by eating better and being more active. Where can you learn more? Go to http://regina-angel.info/. Enter 596 1453 in the search box to learn more about \"Learning About Healthy Weight. \" Current as of: October 13, 2016 Content Version: 11.4 © 0821-8880 [employer]. Care instructions adapted under license by [employer] (which disclaims liability or warranty for this information). If you have questions about a medical condition or this instruction, always ask your healthcare professional. [address] any warranty or liability for your use of this information. Introducing [facility]! [employer] introduces [employer] patient portal. Now you can access parts of your medical record, email your doctor's office, and request medication refills online. 1. In your internet browser, go to [email or web address] 2. Click on the First Time User? Click Here link in the Sign In box. You will see the New Member Sign Up page. 3. Enter your ripp[employer] Access Code exactly as it appears below. You will not need to use this code after youve completed the sign-up process. If you do not sign up before the expiration date, you must request a new code. · [employer] Access Code: [email or web address] Expires: 8/13/2018  4:16 PM 
 
4. Enter the last four digits of your Social Security Number (xxxx) and Date of Birth (mm/dd/yyyy) as indicated and click Submit. You will be taken to the next sign-up page. 5. Create a [employer] ID. This will be your [employer] login ID and cannot be changed, so think of one that is secure and easy to remember. 6. Create a VividCortext password. You can change your password at any time. 7. Enter your Password Reset Question and Answer. This can be used at a later time if you forget your password. 8. Enter your e-mail address. You will receive e-mail notification when new information is available in [address] 9. Click Sign Up. You can now view and download portions of your medical record. 10. Click the Download Summary menu link to download a portable copy of your medical information. If you have questions, please visit the Frequently Asked Questions section of the [employer] website. Remember, [employer] is NOT to be used for urgent needs. For medical emergencies, dial 911. Now available from your iPhone and Android! Please provide this summary of care documentation to your next provider. Your primary care clinician is listed as Jeff Pruitt. If you have any questions after today's visit, please call [phone].

## 2018-05-15 NOTE — PROGRESS NOTES
Chief Complaint   Patient presents with    Weight Gain     1. Have you been to the ER, urgent care clinic since your last visit? Hospitalized since your last visit? No    2. Have you seen or consulted any other health care providers outside of the [address] [place] [address] since your last visit? Include any pap smears or colon screening.  No    complains of red spot on left breast, weight gain    Mammogram in next couple of weeks

## 2018-05-15 NOTE — PATIENT INSTRUCTIONS
It was a pleasure to see you! As discussed:    Weight Management Plan & Reflection  Review Weight Loss Medication List   Keep a Food Log   See Nutritionist   Angelica Aranda  Nutrition    [phone]  Weight History Reflection   Complete Weight History Reflection- What was going on in your life at time during each weight? Period of weight gain Weight loss? Lifestyle Reflection  What foods/ drinks are most difficult for you to give up? Why? How much exercise are getting? Why? How much water are you drinking? Why? How many hours do you sleep at night? Why? Any other reflections on your weight journey? Learning About Healthy Weight  What is a healthy weight? A healthy weight is the weight at which you feel good about yourself and have energy for work and play. It's also one that lowers your risk for health problems. What can you do to stay at a healthy weight? It can be hard to stay at a healthy weight, especially when fast food, vending-machine snacks, and processed foods are so easy to find. And with your busy lifestyle, activity may be low on your list of things to do. But staying at a healthy weight may be easier than you think. Here are some dos and don'ts for staying at a healthy weight:  Do eat healthy foods  The kinds of foods you eat have a big impact on both your weight and your health. Reaching and staying at a healthy weight is not about going on a diet. It's about making healthier food choices every day and changing your diet for good. Healthy eating means eating a variety of foods so that you get all the nutrients you need. Your body needs protein, carbohydrate, and fats for energy. They keep your heart beating, your brain active, and your muscles working. On most days, try to eat from each food group. This means eating a variety of:  · Whole grains, such as whole wheat breads and pastas. · Fruits and vegetables. · Dairy products, such as low-fat milk, yogurt, and cheese.   · Lean proteins, such as all types of fish, chicken without the skin, and beans. Don't have too much or too little of one thing. All foods, if eaten in moderation, can be part of healthy eating. Even sweets can be okay. If your favorite foods are high in fat, salt, sugar, or calories, limit how often you eat them. Eat smaller servings, or look for healthy substitutes. Do watch what you eat  Many people eat more than their bodies need. Part of staying at a healthy weight means learning how much food you really need from day to day and not eating more than that. Even with healthy foods, eating too much can make you gain weight. Having a well-balanced diet means that you eat enough, but not too much, and that your food gives you the nutrients you need to stay healthy. So listen to your body. Eat when you're hungry. Stop when you feel satisfied. It's a good idea to have healthy snacks ready for when you get hungry. Keep healthy snacks with you at work, in your car, and at home. If you have a healthy snack easily available, you'll be less likely to pick a candy bar or bag of chips from a vending machine instead. Some healthy snacks you might want to keep on hand are fruit, low-fat yogurt, string cheese, low-fat microwave popcorn, raisins and other dried fruit, nuts, whole wheat crackers, pretzels, carrots, celery sticks, and broccoli. Do some physical activity  A big part of reaching and staying at a healthy weight is being active. When you're active, you burn calories. This makes it easier to reach and stay at a healthy weight. When you're active on a regular basis, your body burns more calories, even when you're at rest. Being active helps you lose fat and build lean muscle. Try to be active for at least 1 hour every day. This may sound like a lot, but it's okay to be active in smaller blocks of time that add up to 1 hour a day. Any activity that makes your heart beat faster and keeps it there for a while counts.  A brisk walk, run, or swim will get your heart beating faster. So will climbing stairs, shooting baskets, or cycling. Even some household chores like vacuuming and mowing the lawn will get your heart rate up. Pick activities that you enjoy-ones that make your heart beat faster, your muscles stronger, and your muscles and joints more flexible. If you find more than one thing you like doing, do them all. You don't have to do the same thing every day. Don't diet  Diets don't work. Diets are temporary. Because you give up so much when you diet, you may be hungry and think about food all the time. And after you stop dieting, you also may overeat to make up for what you missed. Most people who diet end up gaining back the pounds they lost-and more. Remember that healthy bodies come in lots of shapes and sizes. Everyone can get healthier by eating better and being more active. Where can you learn more? Go to http://regina-angel.info/. Enter 537 3721 in the search box to learn more about \"Learning About Healthy Weight. \"  Current as of: October 13, 2016  Content Version: 11.4  © 4234-0560 [employer]wise[employer]. Care instructions adapted under license by [employer] (which disclaims liability or warranty for this information). If you have questions about a medical condition or this instruction, always ask your healthcare professional. [place] [address] any warranty or liability for your use of this information.

## 2018-05-17 ENCOUNTER — TELEPHONE (OUTPATIENT)
Dept: INTERNAL MEDICINE CLINIC | Age: 53
End: 2018-05-17

## 2018-05-23 ENCOUNTER — TELEPHONE (OUTPATIENT)
Dept: INTERNAL MEDICINE CLINIC | Age: 53
End: 2018-05-23

## 2018-05-23 NOTE — TELEPHONE ENCOUNTER
Patient calls to request a prescription for diet medication that would be recommended for her, she is ok with any from the list if applicable.  She has an appt with the nutritionist in mid June and would like to get started on something sooner, please advise- she is ok to have my-chart response

## 2018-05-24 NOTE — TELEPHONE ENCOUNTER
Patient informed may schedule for a sooner appt. Prior to starting a new diet medication prescription.

## 2018-05-29 ENCOUNTER — OFFICE VISIT (OUTPATIENT)
Dept: INTERNAL MEDICINE CLINIC | Age: 53
End: 2018-05-29

## 2018-05-29 VITALS
SYSTOLIC BLOOD PRESSURE: 100 MMHG | TEMPERATURE: 97.8 F | DIASTOLIC BLOOD PRESSURE: 76 MMHG | WEIGHT: 197.6 LBS | BODY MASS INDEX: 33.73 KG/M2 | RESPIRATION RATE: 19 BRPM | HEART RATE: 96 BPM | OXYGEN SATURATION: 98 % | HEIGHT: 64 IN

## 2018-05-29 DIAGNOSIS — I87.303 STASIS EDEMA OF BOTH LOWER EXTREMITIES: ICD-10-CM

## 2018-05-29 DIAGNOSIS — R63.5 WEIGHT INCREASE: Primary | ICD-10-CM

## 2018-05-29 RX ORDER — PHENTERMINE HYDROCHLORIDE 15 MG/1
15 CAPSULE ORAL
Qty: 30 CAP | Refills: 1 | Status: SHIPPED | OUTPATIENT
Start: 2018-05-29 | End: 2018-08-03

## 2018-05-29 NOTE — PROGRESS NOTES
HISTORY OF PRESENT ILLNESS  Bandar Alegre is a 48 y.o. female. HPI  Cardiovascular Review:  The patient has  Edema and obesity Body mass index is 34.15 kg/(m^2). Has scheduled with nutritionist. Appt in 1.5 week    Diet and Lifestyle: Vegan;   Home BP Monitoring: is not measured at home. Pertinent ROS: taking medications as instructed, no medication side effects noted, no TIA's, no chest pain on exertion, no dyspnea on exertion, no swelling of ankles. Review of Systems   Constitutional: Negative for diaphoresis, fever and weight loss. Eyes: Negative for blurred vision and pain. Respiratory: Negative for shortness of breath. Cardiovascular: Negative for chest pain, orthopnea and leg swelling. Neurological: Negative for focal weakness and headaches. Psychiatric/Behavioral: Negative for depression. Patient Active Problem List    Diagnosis Date Noted    Anxiety 05/15/2018    Stasis edema of both lower extremities 01/16/2018       Current Outpatient Prescriptions   Medication Sig Dispense Refill    phentermine (ADIPEX_P) 15 mg capsule Take 1 Cap by mouth every morning. Max Daily Amount: 15 mg. 30 Cap 1    venlafaxine-SR (EFFEXOR-XR) 75 mg capsule TAKE 1 CAPSULE BY MOUTH EVERY DAY 30 Cap 1    SYNTHROID 150 mcg tablet TAKE 1 TABLET EVERY DAY  2    hydroCHLOROthiazide (MICROZIDE) 12.5 mg capsule Take 1 Cap by mouth daily as needed. For severe edema 90 Cap 1       Allergies   Allergen Reactions    Azithromycin Other (comments)     Upset stomach      Visit Vitals    /76 (BP 1 Location: Right arm, BP Patient Position: Sitting)    Pulse 96    Temp 97.8 °F (36.6 °C) (Oral)    Resp 19    Ht 5' 3.78\" (1.62 m)    Wt 197 lb 9.6 oz (89.6 kg)    SpO2 98%    BMI 34.15 kg/m2       Physical Exam   Constitutional: She is oriented to person, place, and time. No distress. Cardiovascular: Normal rate and regular rhythm. Pulmonary/Chest: Breath sounds normal. No respiratory distress.  She has no wheezes. She has no rales. Neurological: She is alert and oriented to person, place, and time. Psychiatric: She has a normal mood and affect. Lab Results  Component Value Date/Time   WBC 5.3 01/16/2018 12:00 AM   HGB 13.4 01/16/2018 12:00 AM   HCT 40.9 01/16/2018 12:00 AM   PLATELET 754 33/39/2094 12:00 AM   MCV 93 01/16/2018 12:00 AM     Lab Results  Component Value Date/Time   Hemoglobin A1c 5.3 01/16/2018 12:00 AM   Glucose 96 01/16/2018 12:00 AM   LDL, calculated 153 (H) 01/16/2018 12:00 AM   Creatinine 0.75 01/16/2018 12:00 AM      Lab Results  Component Value Date/Time   Cholesterol, total 248 (H) 01/16/2018 12:00 AM   HDL Cholesterol 82 01/16/2018 12:00 AM   LDL, calculated 153 (H) 01/16/2018 12:00 AM   Triglyceride 67 01/16/2018 12:00 AM     Lab Results  Component Value Date/Time   ALT (SGPT) 14 01/16/2018 12:00 AM   AST (SGOT) 15 01/16/2018 12:00 AM   Alk. phosphatase 51 01/16/2018 12:00 AM   Bilirubin, total 0.4 01/16/2018 12:00 AM   Albumin 4.4 01/16/2018 12:00 AM   Protein, total 6.5 01/16/2018 12:00 AM   PLATELET 749 68/84/0074 12:00 AM       Lab Results  Component Value Date/Time   GFR est non-AA 93 01/16/2018 12:00 AM   GFR est  01/16/2018 12:00 AM   Creatinine 0.75 01/16/2018 12:00 AM   BUN 16 01/16/2018 12:00 AM   Sodium 140 01/16/2018 12:00 AM   Potassium 4.4 01/16/2018 12:00 AM   Chloride 99 01/16/2018 12:00 AM   CO2 26 01/16/2018 12:00 AM     Lab Results  Component Value Date/Time   TSH 0.246 (L) 01/16/2018 12:00 AM   T3 Uptake 32 01/16/2018 12:00 AM   T4, Total 7.6 01/16/2018 12:00 AM      Lab Results   Component Value Date/Time    Glucose 96 01/16/2018 12:00 AM       EKG: normal EKG, normal sinus rhythm. ASSESSMENT and PLAN  Diagnoses and all orders for this visit:    1. Weight increase- . EKG wnl. We decided to phentermine due to cost and titratable. Stop vegan diet. Start whole 30.    See nutritionist.   [place]  reviewed    -     AMB POC EKG ROUTINE W/ 12 LEADS, INTER & REP  - phentermine (ADIPEX_P) 15 mg capsule; Take 1 Cap by mouth every morning. Max Daily Amount: 15 mg.    2. Stasis edema of both lower extremities- stable. Follow-up Disposition:  Return in about 8 weeks (around 7/24/2018) for Follow-up, Weight Management. Medication risks/benefits/costs/interactions/alternatives discussed with patient. Marissa Keith  was given an after visit summary which includes diagnoses, current - medications, & vitals. she expressed understanding with the diagnosis and plan.     20 minutes of 25 minutes of care coordination was spent counseling patient on treatment plan and assessing understanding

## 2018-05-29 NOTE — PATIENT INSTRUCTIONS
It was a pleasure to see you! As discussed:    . Your EKG is fine. We decided to phentermine  Stop vegan diet. Start whole 30. See nutritionist.     Starting a Weight Loss Plan: Care Instructions  Your Care Instructions  If you are thinking about losing weight, it can be hard to know where to start. Your doctor can help you set up a weight loss plan that best meets your needs. You may want to take a class on nutrition or exercise, or join a weight loss support group. If you have questions about how to make changes to your eating or exercise habits, ask your doctor about seeing a registered dietitian or an exercise specialist.  It can be a big challenge to lose weight. But you do not have to make huge changes at once. Make small changes, and stick with them. When those changes become habit, add a few more changes. If you do not think you are ready to make changes right now, try to pick a date in the future. Make an appointment to see your doctor to discuss whether the time is right for you to start a plan. Follow-up care is a key part of your treatment and safety. Be sure to make and go to all appointments, and call your doctor if you are having problems. It's also a good idea to know your test results and keep a list of the medicines you take. How can you care for yourself at home? · Set realistic goals. Many people expect to lose much more weight than is likely. A weight loss of 5% to 10% of your body weight may be enough to improve your health. · Get family and friends involved to provide support. Talk to them about why you are trying to lose weight, and ask them to help. They can help by participating in exercise and having meals with you, even if they may be eating something different. · Find what works best for you. If you do not have time or do not like to cook, a program that offers meal replacement bars or shakes may be better for you.  Or if you like to prepare meals, finding a plan that includes daily menus and recipes may be best.  · Ask your doctor about other health professionals who can help you achieve your weight loss goals. ¨ A dietitian can help you make healthy changes in your diet. ¨ An exercise specialist or  can help you develop a safe and effective exercise program.  ¨ A counselor or psychiatrist can help you cope with issues such as depression, anxiety, or family problems that can make it hard to focus on weight loss. · Consider joining a support group for people who are trying to lose weight. Your doctor can suggest groups in your area. Where can you learn more? Go to http://[facility].info/. Enter M897 in the search box to learn more about \"Starting a Weight Loss Plan: Care Instructions. \"  Current as of: October 13, 2016  Content Version: 11.4  © 4914-0079 [employer], Incorporated. Care instructions adapted under license by [employer] (which disclaims liability or warranty for this information). If you have questions about a medical condition or this instruction, always ask your healthcare professional. [address] any warranty or liability for your use of this information.

## 2018-07-18 DIAGNOSIS — I87.303 STASIS EDEMA OF BOTH LOWER EXTREMITIES: ICD-10-CM

## 2018-07-18 RX ORDER — HYDROCHLOROTHIAZIDE 12.5 MG/1
CAPSULE ORAL
Qty: 90 CAP | Refills: 1 | Status: SHIPPED | OUTPATIENT
Start: 2018-07-18 | End: 2020-06-22 | Stop reason: ALTCHOICE

## 2018-08-03 ENCOUNTER — OFFICE VISIT (OUTPATIENT)
Dept: INTERNAL MEDICINE CLINIC | Age: 53
End: 2018-08-03

## 2018-08-03 VITALS
HEART RATE: 71 BPM | HEIGHT: 64 IN | TEMPERATURE: 97.8 F | OXYGEN SATURATION: 97 % | WEIGHT: 189.8 LBS | DIASTOLIC BLOOD PRESSURE: 65 MMHG | BODY MASS INDEX: 32.4 KG/M2 | RESPIRATION RATE: 12 BRPM | SYSTOLIC BLOOD PRESSURE: 106 MMHG

## 2018-08-03 DIAGNOSIS — E66.9 OBESITY (BMI 30-39.9): ICD-10-CM

## 2018-08-03 DIAGNOSIS — E03.9 ACQUIRED HYPOTHYROIDISM: Primary | ICD-10-CM

## 2018-08-03 DIAGNOSIS — R63.5 WEIGHT INCREASE: ICD-10-CM

## 2018-08-03 DIAGNOSIS — I87.303 STASIS EDEMA OF BOTH LOWER EXTREMITIES: ICD-10-CM

## 2018-08-03 DIAGNOSIS — F41.9 ANXIETY: ICD-10-CM

## 2018-08-03 RX ORDER — VENLAFAXINE HYDROCHLORIDE 75 MG/1
75 CAPSULE, EXTENDED RELEASE ORAL DAILY
Qty: 30 CAP | Refills: 5 | Status: SHIPPED | OUTPATIENT
Start: 2018-08-03 | End: 2020-06-22 | Stop reason: ALTCHOICE

## 2018-08-03 RX ORDER — PHENTERMINE HYDROCHLORIDE 30 MG/1
30 CAPSULE ORAL
Qty: 30 CAP | Refills: 1 | Status: SHIPPED | OUTPATIENT
Start: 2018-08-03 | End: 2018-08-15 | Stop reason: SDUPTHER

## 2018-08-03 NOTE — PROGRESS NOTES
HISTORY OF PRESENT ILLNESS Jihan Kennedy is a 48 y.o. female. HPI Cardiovascular Review The patient has obesity Body mass index is 32.8 kg/(m^2). has lost 7lbs since last visit. She has started Whole 30 but still hungry with phentermine. Diet and Lifestyle: nonsmoker Home BP Monitoring: is not measured at home. Pertinent ROS: taking medications as instructed (phenteramine), no medication side effects noted, no TIA's, no chest pain on exertion, no dyspnea on exertion, no swelling of ankles (using HCTZ 2x/ prn). Anxiety Patient is seen for anxiety disorder. Current treatment includes Effexor but she has forgotten to take it since starting anxiety  and no other therapies. Ongoing symptoms include: sad Patient denies: suicidal ideation, homocidal ideation. Reported side effects from the treatment: none. Hypothyroidism Patient is seen for followup of hypothyroidism. Thyroid ROS: denies fatigue, weight changes, heat/cold intolerance, bowel/skin changes or CVS symptoms. ROSper HPI Patient Active Problem List  
 Diagnosis Date Noted  Anxiety 05/15/2018  Stasis edema of both lower extremities 01/16/2018 Current Outpatient Prescriptions Medication Sig Dispense Refill  hydroCHLOROthiazide (MICROZIDE) 12.5 mg capsule TAKE 1 CAPSULE BY MOUTH EVERY DAY AS NEEDED FOR SEVERE EDEMA 90 Cap 1  phentermine (ADIPEX_P) 15 mg capsule Take 1 Cap by mouth every morning. Max Daily Amount: 15 mg. 30 Cap 1  
 SYNTHROID 150 mcg tablet TAKE 1 TABLET EVERY DAY  2  
 venlafaxine-SR (EFFEXOR-XR) 75 mg capsule TAKE 1 CAPSULE BY MOUTH EVERY DAY 30 Cap 1 Allergies Allergen Reactions  Azithromycin Other (comments) Upset stomach Visit Vitals  /65  Pulse 71  Temp 97.8 °F (36.6 °C) (Oral)  Resp 12  Ht 5' 3.78\" (1.62 m)  Wt 189 lb 12.8 oz (86.1 kg)  SpO2 97%  BMI 32.8 kg/m2 Physical Exam  
Constitutional: She is oriented to person, place, and time. She appears well-developed. No distress. Eyes: Conjunctivae are normal.  
Neck: Neck supple. No thyromegaly present. Cardiovascular: Normal rate, regular rhythm and normal heart sounds. Pulmonary/Chest: Effort normal and breath sounds normal. No respiratory distress. She has no wheezes. She has no rales. She exhibits no tenderness. Lymphadenopathy:  
  She has no cervical adenopathy. Neurological: She is alert and oriented to person, place, and time. Skin: Skin is warm. Psychiatric: She has a normal mood and affect. ASSESSMENT and PLAN Diagnoses and all orders for this visit: 
 
1. Acquired hypothyroidism- check TFTs prior to next appointment 
-     TSH 3RD GENERATION 
-     T4, FREE 
-     METABOLIC PANEL, COMPREHENSIVE 2. Anxiety has been off of that sore and notice mood effects. Will resume. Patient Education:  Reviewed concept of anxiety as biochemical imbalance of neurotransmitters and rationale for treatment. Instructed patient to contact office or 911 promptly should condition worsen or any new symptoms appear and provided on-call telephone numbers. IF THE PATIENT HAS ANY SUICIDAL OR HOMICIDAL IDEATION, CALL THE OFFICE, DISCUSS WITH A SUPPORT MEMBER OR GO TO THE ER IMMEDIATELY. Patient was agreeable with this 
 
-     venlafaxine-SR [facility]) 75 mg capsule; Take 1 Cap by mouth daily. 3.  Stasis edema of both lower extremitiesstable well-controlled with as needed hydrochlorothiazide. Check electrolytes -     METABOLIC PANEL, COMPREHENSIVE 
-     MAGNESIUM 4. Obesity (BMI 30-39.9) has lost 7 pounds with the addition of phentermine and change to whole 30 diet. Is still having cravings and increased appetite. Will titrate phentermine to 30 mg dosing to see if this improves her symptoms. Continue phentermine for minimum 3 month trial. 
-     phentermine 30 mg capsule; Take 1 Cap by mouth every morning. Max Daily Amount: 30 mg.  
 
 
Follow-up Disposition: 
Return in about 3 months (around 11/3/2018) for Follow-up, Weight Management. Medication risks/benefits/costs/interactions/alternatives discussed with patient. Nola Breaux  was given an after visit summary which includes diagnoses, current medications, & vitals. she expressed understanding with the diagnosis and plan.

## 2018-08-03 NOTE — PATIENT INSTRUCTIONS
It was a pleasure to see you! As discussed: 
 
Congratulations on your weight loss and positive lifestyle changes!!! We increased your phentermine dose.   
 
Have a protein shake for breakfast.

## 2018-08-15 ENCOUNTER — DOCUMENTATION ONLY (OUTPATIENT)
Dept: INTERNAL MEDICINE CLINIC | Age: 53
End: 2018-08-15

## 2018-08-15 DIAGNOSIS — E66.9 OBESITY (BMI 30-39.9): ICD-10-CM

## 2018-08-15 RX ORDER — PHENTERMINE HYDROCHLORIDE 30 MG/1
30 CAPSULE ORAL
Qty: 30 CAP | Refills: 0 | OUTPATIENT
Start: 2018-08-15 | End: 2018-09-26 | Stop reason: SDUPTHER

## 2018-09-26 ENCOUNTER — TELEPHONE (OUTPATIENT)
Dept: INTERNAL MEDICINE CLINIC | Age: 53
End: 2018-09-26

## 2018-09-26 DIAGNOSIS — E66.9 OBESITY (BMI 30-39.9): ICD-10-CM

## 2018-09-26 NOTE — TELEPHONE ENCOUNTER
Patient thought she had refills on this med, but does not. If there is any possibility of getting this today, she would appreciate it. She took her last one today.

## 2018-10-01 RX ORDER — PHENTERMINE HYDROCHLORIDE 30 MG/1
30 CAPSULE ORAL
Qty: 30 CAP | Refills: 0 | OUTPATIENT
Start: 2018-10-01 | End: 2019-04-29

## 2018-10-02 NOTE — TELEPHONE ENCOUNTER
Called local pharmacy, LM received v/o from Dr Ino Kaminski to phone in Phentermine 30 mg, 30 cap/0 refills.

## 2018-12-10 NOTE — TELEPHONE ENCOUNTER
She has not scheduled a follow-up appointment. If this medication was effective will call in 30 days with one refill she needs to schedule follow-up. Hyperlipidemia

## 2019-01-22 ENCOUNTER — OFFICE VISIT (OUTPATIENT)
Dept: INTERNAL MEDICINE CLINIC | Age: 54
End: 2019-01-22

## 2019-01-22 VITALS
BODY MASS INDEX: 32.27 KG/M2 | HEIGHT: 64 IN | RESPIRATION RATE: 17 BRPM | SYSTOLIC BLOOD PRESSURE: 112 MMHG | WEIGHT: 189 LBS | OXYGEN SATURATION: 98 % | TEMPERATURE: 97.7 F | HEART RATE: 74 BPM | DIASTOLIC BLOOD PRESSURE: 62 MMHG

## 2019-01-22 DIAGNOSIS — J02.9 SORE THROAT: ICD-10-CM

## 2019-01-22 DIAGNOSIS — R68.89 INFLUENZA-LIKE SYMPTOMS: ICD-10-CM

## 2019-01-22 DIAGNOSIS — J21.9 ACUTE BRONCHIOLITIS DUE TO UNSPECIFIED ORGANISM: Primary | ICD-10-CM

## 2019-01-22 LAB
FLUAV+FLUBV AG NOSE QL IA.RAPID: NEGATIVE POS/NEG
FLUAV+FLUBV AG NOSE QL IA.RAPID: NEGATIVE POS/NEG
S PYO AG THROAT QL: NEGATIVE
VALID INTERNAL CONTROL?: YES
VALID INTERNAL CONTROL?: YES

## 2019-01-22 RX ORDER — CODEINE PHOSPHATE AND GUAIFENESIN 10; 100 MG/5ML; MG/5ML
10 SOLUTION ORAL
Qty: 200 ML | Refills: 0 | Status: SHIPPED | OUTPATIENT
Start: 2019-01-22 | End: 2020-06-22 | Stop reason: ALTCHOICE

## 2019-01-22 RX ORDER — CEFUROXIME AXETIL 250 MG/1
250 TABLET ORAL 2 TIMES DAILY
Qty: 14 TAB | Refills: 0 | Status: SHIPPED | OUTPATIENT
Start: 2019-01-22 | End: 2019-01-29

## 2019-01-22 NOTE — PATIENT INSTRUCTIONS
Bronchitis: Care Instructions  Your Care Instructions    Bronchitis is inflammation of the bronchial tubes, which carry air to the lungs. The tubes swell and produce mucus, or phlegm. The mucus and inflamed bronchial tubes make you cough. You may have trouble breathing. Most cases of bronchitis are caused by viruses like those that cause colds. Antibiotics usually do not help and they may be harmful. Bronchitis usually develops rapidly and lasts about 2 to 3 weeks in otherwise healthy people. Follow-up care is a key part of your treatment and safety. Be sure to make and go to all appointments, and call your doctor if you are having problems. It's also a good idea to know your test results and keep a list of the medicines you take. How can you care for yourself at home? · Take all medicines exactly as prescribed. Call your doctor if you think you are having a problem with your medicine. · Get some extra rest.  · Take an over-the-counter pain medicine, such as acetaminophen (Tylenol), ibuprofen (Advil, Motrin), or naproxen (Aleve) to reduce fever and relieve body aches. Read and follow all instructions on the label. · Do not take two or more pain medicines at the same time unless the doctor told you to. Many pain medicines have acetaminophen, which is Tylenol. Too much acetaminophen (Tylenol) can be harmful. · Take an over-the-counter cough medicine that contains dextromethorphan to help quiet a dry, hacking cough so that you can sleep. Avoid cough medicines that have more than one active ingredient. Read and follow all instructions on the label. · Breathe moist air from a humidifier, hot shower, or sink filled with hot water. The heat and moisture will thin mucus so you can cough it out. · Do not smoke. Smoking can make bronchitis worse. If you need help quitting, talk to your doctor about stop-smoking programs and medicines. These can increase your chances of quitting for good.   When should you call for help? Call 911 anytime you think you may need emergency care. For example, call if:    · You have severe trouble breathing.    Call your doctor now or seek immediate medical care if:    · You have new or worse trouble breathing.     · You cough up dark brown or bloody mucus (sputum).     · You have a new or higher fever.     · You have a new rash.    Watch closely for changes in your health, and be sure to contact your doctor if:    · You cough more deeply or more often, especially if you notice more mucus or a change in the color of your mucus.     · You are not getting better as expected. Where can you learn more? Go to http://[facility].info/. Enter H333 in the search box to learn more about \"Bronchitis: Care Instructions. \"  Current as of: September 5, 2018  Content Version: 11.9  © 0029-5307 [employer]. Care instructions adapted under license by [employer] (which disclaims liability or warranty for this information). If you have questions about a medical condition or this instruction, always ask your healthcare professional. [address] any warranty or liability for your use of this information.

## 2019-01-22 NOTE — PROGRESS NOTES
HISTORY OF PRESENT ILLNESS  Sabina Spencer is a 48 y.o. female. URI    The history is provided by the patient. This is a new problem. Episode onset: 5 d. The problem has not changed since onset. Patient reports a subjective fever - was not measured. Associated symptoms include chest pain, diarrhea, congestion, headaches, sore throat and cough. Pertinent negatives include no nausea and no vomiting. Associated symptoms comments: CP with cough, breathing- tracheal  Diarrhea x 2 d only. Treatments tried: multiple otc. Review of Systems   HENT: Positive for congestion and sore throat. Respiratory: Positive for cough. Negative for sputum production. Cardiovascular: Positive for chest pain. Gastrointestinal: Positive for diarrhea. Negative for nausea and vomiting. Neurological: Positive for headaches. Physical Exam   Constitutional: No distress. HENT:   Right Ear: Tympanic membrane and ear canal normal.   Left Ear: Tympanic membrane and ear canal normal.   Nose: Right sinus exhibits no maxillary sinus tenderness and no frontal sinus tenderness. Left sinus exhibits no frontal sinus tenderness. Mouth/Throat: Posterior oropharyngeal edema and posterior oropharyngeal erythema present. No oropharyngeal exudate. Eyes: Conjunctivae are normal. Right eye exhibits no discharge. Left eye exhibits no discharge. Neck: Neck supple. Cardiovascular: Normal rate and regular rhythm. Exam reveals no gallop and no friction rub. No murmur heard. Pulmonary/Chest: Effort normal and breath sounds normal.   Lymphadenopathy:     She has no cervical adenopathy. Nursing note and vitals reviewed. ASSESSMENT and PLAN  Diagnoses and all orders for this visit:    1. Acute bronchiolitis due to unspecified organism  -     guaiFENesin-codeine (ROBITUSSIN AC) 100-10 mg/5 mL solution; Take 10 mL by mouth four (4) times daily as needed for Cough. Max Daily Amount: 40 mL. -     cefUROXime (CEFTIN) 250 mg tablet;  Take 1 Tab by mouth two (2) times a day for 7 days. 2. Sore throat  -     AMB POC RAPID STREP A- neg    3.  Influenza-like symptoms  -     AMB POC AKIN INFLUENZA A/B TEST- neg

## 2019-04-27 ENCOUNTER — APPOINTMENT (OUTPATIENT)
Dept: GENERAL RADIOLOGY | Age: 54
End: 2019-04-27
Attending: EMERGENCY MEDICINE
Payer: COMMERCIAL

## 2019-04-27 ENCOUNTER — HOSPITAL ENCOUNTER (OUTPATIENT)
Age: 54
Setting detail: OBSERVATION
Discharge: HOME | End: 2019-04-29
Attending: EMERGENCY MEDICINE | Admitting: HOSPITALIST
Payer: COMMERCIAL

## 2019-04-27 ENCOUNTER — APPOINTMENT (OUTPATIENT)
Dept: CT IMAGING | Age: 54
End: 2019-04-27
Attending: EMERGENCY MEDICINE
Payer: COMMERCIAL

## 2019-04-27 DIAGNOSIS — R42 DIZZINESS: ICD-10-CM

## 2019-04-27 DIAGNOSIS — R55 SYNCOPE AND COLLAPSE: Primary | ICD-10-CM

## 2019-04-27 DIAGNOSIS — R11.0 NAUSEA WITHOUT VOMITING: ICD-10-CM

## 2019-04-27 LAB
ALBUMIN SERPL-MCNC: 3.5 G/DL (ref 3.5–5)
ALBUMIN/GLOB SERPL: 1.1 {RATIO} (ref 1.1–2.2)
ALP SERPL-CCNC: 53 U/L (ref 45–117)
ALT SERPL-CCNC: 27 U/L (ref 12–78)
ANION GAP SERPL CALC-SCNC: 7 MMOL/L (ref 5–15)
AST SERPL-CCNC: 13 U/L (ref 15–37)
BASOPHILS # BLD: 0.1 K/UL (ref 0–0.1)
BASOPHILS NFR BLD: 1 % (ref 0–1)
BILIRUB DIRECT SERPL-MCNC: <0.1 MG/DL (ref 0–0.2)
BILIRUB SERPL-MCNC: 0.3 MG/DL (ref 0.2–1)
BUN SERPL-MCNC: 23 MG/DL (ref 6–20)
BUN/CREAT SERPL: 27 (ref 12–20)
CALCIUM SERPL-MCNC: 8.7 MG/DL (ref 8.5–10.1)
CHLORIDE SERPL-SCNC: 106 MMOL/L (ref 97–108)
CK SERPL-CCNC: 112 U/L (ref 26–192)
CO2 SERPL-SCNC: 25 MMOL/L (ref 21–32)
COMMENT, HOLDF: NORMAL
CREAT SERPL-MCNC: 0.84 MG/DL (ref 0.55–1.02)
DIFFERENTIAL METHOD BLD: NORMAL
EOSINOPHIL # BLD: 0.2 K/UL (ref 0–0.4)
EOSINOPHIL NFR BLD: 2 % (ref 0–7)
ERYTHROCYTE [DISTWIDTH] IN BLOOD BY AUTOMATED COUNT: 12.6 % (ref 11.5–14.5)
GLOBULIN SER CALC-MCNC: 3.2 G/DL (ref 2–4)
GLUCOSE SERPL-MCNC: 96 MG/DL (ref 65–100)
HCT VFR BLD AUTO: 37.9 % (ref 35–47)
HGB BLD-MCNC: 12.1 G/DL (ref 11.5–16)
IMM GRANULOCYTES # BLD AUTO: 0 K/UL (ref 0–0.04)
IMM GRANULOCYTES NFR BLD AUTO: 0 % (ref 0–0.5)
LYMPHOCYTES # BLD: 3.1 K/UL (ref 0.8–3.5)
LYMPHOCYTES NFR BLD: 40 % (ref 12–49)
MAGNESIUM SERPL-MCNC: 2 MG/DL (ref 1.6–2.4)
MCH RBC QN AUTO: 30 PG (ref 26–34)
MCHC RBC AUTO-ENTMCNC: 31.9 G/DL (ref 30–36.5)
MCV RBC AUTO: 94 FL (ref 80–99)
MONOCYTES # BLD: 0.4 K/UL (ref 0–1)
MONOCYTES NFR BLD: 5 % (ref 5–13)
NEUTS SEG # BLD: 4 K/UL (ref 1.8–8)
NEUTS SEG NFR BLD: 52 % (ref 32–75)
NRBC # BLD: 0 K/UL (ref 0–0.01)
NRBC BLD-RTO: 0 PER 100 WBC
PLATELET # BLD AUTO: 172 K/UL (ref 150–400)
PMV BLD AUTO: 11.1 FL (ref 8.9–12.9)
POTASSIUM SERPL-SCNC: 3.4 MMOL/L (ref 3.5–5.1)
PROT SERPL-MCNC: 6.7 G/DL (ref 6.4–8.2)
RBC # BLD AUTO: 4.03 M/UL (ref 3.8–5.2)
SAMPLES BEING HELD,HOLD: NORMAL
SODIUM SERPL-SCNC: 138 MMOL/L (ref 136–145)
TROPONIN I SERPL-MCNC: <0.05 NG/ML
WBC # BLD AUTO: 7.7 K/UL (ref 3.6–11)

## 2019-04-27 PROCEDURE — 96361 HYDRATE IV INFUSION ADD-ON: CPT

## 2019-04-27 PROCEDURE — 85025 COMPLETE CBC W/AUTO DIFF WBC: CPT

## 2019-04-27 PROCEDURE — 80048 BASIC METABOLIC PNL TOTAL CA: CPT

## 2019-04-27 PROCEDURE — 81001 URINALYSIS AUTO W/SCOPE: CPT

## 2019-04-27 PROCEDURE — 82550 ASSAY OF CK (CPK): CPT

## 2019-04-27 PROCEDURE — 99285 EMERGENCY DEPT VISIT HI MDM: CPT

## 2019-04-27 PROCEDURE — 74011250636 HC RX REV CODE- 250/636: Performed by: EMERGENCY MEDICINE

## 2019-04-27 PROCEDURE — 83735 ASSAY OF MAGNESIUM: CPT

## 2019-04-27 PROCEDURE — 93005 ELECTROCARDIOGRAM TRACING: CPT

## 2019-04-27 PROCEDURE — 84443 ASSAY THYROID STIM HORMONE: CPT

## 2019-04-27 PROCEDURE — 80076 HEPATIC FUNCTION PANEL: CPT

## 2019-04-27 PROCEDURE — 71045 X-RAY EXAM CHEST 1 VIEW: CPT

## 2019-04-27 PROCEDURE — 96374 THER/PROPH/DIAG INJ IV PUSH: CPT

## 2019-04-27 PROCEDURE — 84439 ASSAY OF FREE THYROXINE: CPT

## 2019-04-27 PROCEDURE — 36415 COLL VENOUS BLD VENIPUNCTURE: CPT

## 2019-04-27 PROCEDURE — 84484 ASSAY OF TROPONIN QUANT: CPT

## 2019-04-27 PROCEDURE — 70450 CT HEAD/BRAIN W/O DYE: CPT

## 2019-04-27 RX ORDER — ONDANSETRON 2 MG/ML
4 INJECTION INTRAMUSCULAR; INTRAVENOUS
Status: COMPLETED | OUTPATIENT
Start: 2019-04-27 | End: 2019-04-27

## 2019-04-27 RX ORDER — MECLIZINE HCL 12.5 MG 12.5 MG/1
25 TABLET ORAL
Status: COMPLETED | OUTPATIENT
Start: 2019-04-27 | End: 2019-04-27

## 2019-04-27 RX ADMIN — SODIUM CHLORIDE 1000 ML: 900 INJECTION, SOLUTION INTRAVENOUS at 23:18

## 2019-04-27 RX ADMIN — ONDANSETRON 4 MG: 2 INJECTION INTRAMUSCULAR; INTRAVENOUS at 23:16

## 2019-04-27 RX ADMIN — MECLIZINE 25 MG: 12.5 TABLET ORAL at 23:16

## 2019-04-28 ENCOUNTER — APPOINTMENT (OUTPATIENT)
Dept: MRI IMAGING | Age: 54
End: 2019-04-28
Attending: PSYCHIATRY & NEUROLOGY
Payer: COMMERCIAL

## 2019-04-28 PROBLEM — R55 SYNCOPE: Status: ACTIVE | Noted: 2019-04-28

## 2019-04-28 LAB
APPEARANCE UR: CLEAR
ATRIAL RATE: 60 BPM
BACTERIA URNS QL MICRO: NEGATIVE /HPF
BILIRUB UR QL: NEGATIVE
CALCULATED P AXIS, ECG09: 49 DEGREES
CALCULATED T AXIS, ECG11: 28 DEGREES
COLOR UR: NORMAL
DIAGNOSIS, 93000: NORMAL
EPITH CASTS URNS QL MICRO: NORMAL /LPF
GLUCOSE BLD STRIP.AUTO-MCNC: 90 MG/DL (ref 65–100)
GLUCOSE UR STRIP.AUTO-MCNC: NEGATIVE MG/DL
HGB UR QL STRIP: NEGATIVE
HYALINE CASTS URNS QL MICRO: NORMAL /LPF (ref 0–5)
KETONES UR QL STRIP.AUTO: NEGATIVE MG/DL
LEUKOCYTE ESTERASE UR QL STRIP.AUTO: NEGATIVE
NITRITE UR QL STRIP.AUTO: NEGATIVE
P-R INTERVAL, ECG05: 152 MS
PH UR STRIP: 5.5 [PH] (ref 5–8)
PROT UR STRIP-MCNC: NEGATIVE MG/DL
Q-T INTERVAL, ECG07: 482 MS
QRS DURATION, ECG06: 86 MS
QTC CALCULATION (BEZET), ECG08: 482 MS
RBC #/AREA URNS HPF: NORMAL /HPF (ref 0–5)
SERVICE CMNT-IMP: NORMAL
SP GR UR REFRACTOMETRY: 1.01 (ref 1–1.03)
T4 FREE SERPL-MCNC: 1.1 NG/DL (ref 0.8–1.5)
TSH SERPL DL<=0.05 MIU/L-ACNC: 0.05 UIU/ML (ref 0.36–3.74)
UR CULT HOLD, URHOLD: NORMAL
UROBILINOGEN UR QL STRIP.AUTO: 0.2 EU/DL (ref 0.2–1)
VENTRICULAR RATE, ECG03: 60 BPM
WBC URNS QL MICRO: NORMAL /HPF (ref 0–4)

## 2019-04-28 PROCEDURE — 96372 THER/PROPH/DIAG INJ SC/IM: CPT

## 2019-04-28 PROCEDURE — 74011250636 HC RX REV CODE- 250/636: Performed by: HOSPITALIST

## 2019-04-28 PROCEDURE — 74011250637 HC RX REV CODE- 250/637: Performed by: HOSPITALIST

## 2019-04-28 PROCEDURE — 96361 HYDRATE IV INFUSION ADD-ON: CPT

## 2019-04-28 PROCEDURE — 95816 EEG AWAKE AND DROWSY: CPT | Performed by: HOSPITALIST

## 2019-04-28 PROCEDURE — 70551 MRI BRAIN STEM W/O DYE: CPT

## 2019-04-28 PROCEDURE — 82962 GLUCOSE BLOOD TEST: CPT

## 2019-04-28 PROCEDURE — 99218 HC RM OBSERVATION: CPT

## 2019-04-28 RX ORDER — VENLAFAXINE HYDROCHLORIDE 37.5 MG/1
75 CAPSULE, EXTENDED RELEASE ORAL DAILY
Status: CANCELLED | OUTPATIENT
Start: 2019-04-28

## 2019-04-28 RX ORDER — ENOXAPARIN SODIUM 100 MG/ML
40 INJECTION SUBCUTANEOUS EVERY 24 HOURS
Status: DISCONTINUED | OUTPATIENT
Start: 2019-04-28 | End: 2019-04-29 | Stop reason: HOSPADM

## 2019-04-28 RX ORDER — POTASSIUM CHLORIDE AND SODIUM CHLORIDE 900; 300 MG/100ML; MG/100ML
INJECTION, SOLUTION INTRAVENOUS CONTINUOUS
Status: DISCONTINUED | OUTPATIENT
Start: 2019-04-28 | End: 2019-04-29

## 2019-04-28 RX ORDER — SODIUM CHLORIDE 0.9 % (FLUSH) 0.9 %
5-40 SYRINGE (ML) INJECTION EVERY 8 HOURS
Status: DISCONTINUED | OUTPATIENT
Start: 2019-04-28 | End: 2019-04-29 | Stop reason: HOSPADM

## 2019-04-28 RX ORDER — ACETAMINOPHEN 325 MG/1
650 TABLET ORAL
Status: DISCONTINUED | OUTPATIENT
Start: 2019-04-28 | End: 2019-04-29 | Stop reason: HOSPADM

## 2019-04-28 RX ORDER — LEVOTHYROXINE SODIUM 150 UG/1
150 TABLET ORAL
Status: DISCONTINUED | OUTPATIENT
Start: 2019-04-28 | End: 2019-04-28

## 2019-04-28 RX ORDER — PHENTERMINE HYDROCHLORIDE 30 MG/1
30 CAPSULE ORAL
Status: CANCELLED | OUTPATIENT
Start: 2019-04-28

## 2019-04-28 RX ORDER — SODIUM CHLORIDE 0.9 % (FLUSH) 0.9 %
5-40 SYRINGE (ML) INJECTION AS NEEDED
Status: DISCONTINUED | OUTPATIENT
Start: 2019-04-28 | End: 2019-04-29 | Stop reason: HOSPADM

## 2019-04-28 RX ADMIN — LEVOTHYROXINE SODIUM 150 MCG: 150 TABLET ORAL at 05:39

## 2019-04-28 RX ADMIN — ENOXAPARIN SODIUM 40 MG: 40 INJECTION SUBCUTANEOUS at 09:56

## 2019-04-28 RX ADMIN — Medication 10 ML: at 22:00

## 2019-04-28 RX ADMIN — Medication 10 ML: at 03:16

## 2019-04-28 RX ADMIN — Medication 10 ML: at 05:35

## 2019-04-28 RX ADMIN — SODIUM CHLORIDE AND POTASSIUM CHLORIDE: 9; 2.98 INJECTION, SOLUTION INTRAVENOUS at 03:16

## 2019-04-28 RX ADMIN — SODIUM CHLORIDE AND POTASSIUM CHLORIDE: 9; 2.98 INJECTION, SOLUTION INTRAVENOUS at 17:47

## 2019-04-28 NOTE — ED TRIAGE NOTES
Arrived from home, reporting sudden onset NV and \"spinning sensation in the room,\" patient had LOC, and possible \"twitching,\" during LOC, while patient was having dinner. Denies fall, head trauma. VSS, A&ox4

## 2019-04-28 NOTE — CONSULTS
NEUROLOGY CONSULT NOTE    Name Nikkie Posada Age 48 y.o. MRN [id number]  1965     Consulting Physician: Paty Moran DO      Chief Complaint:  Syncope     Assessment:     · Active Problems:  ·   Syncope (2019)  ·   ·   48year old female with a h/o thyroid CA, vasovagal syncope during prior pregnancies x 2 presenting with a brief syncopal episode while at dinner 19 preceded by dizziness/vertigo lasting <1 minute with observed head/body shaking per family for a few seconds. No associated post-ictal state. No known seizure risk factors. Neurological examination is non-focal.  Head CT reviewed without acute intracranial process. Low suspicion for seizure activity, although will f/u EEG ordered on admission. Obtain MRI Brain to exclude any structural pathology which may have contributed to above events. Patient is anxious regarding the possibility of a brain tumor, although reassurance was provided that this is felt to be unlikely given her benign examination. Suspect vasovagal syncope. She did experience some symptomatic bradycardia and relative hypotension overnight and may also benefit from cardiology evaluation. Orthostatic vitals are negative. Recommendations:   EEG pending  MRI Brain WO ordered  Consider cardiology evaluation given symptomatic bradycardia overnight  Advise discontinuation of HCG injections  Syncope precautions including no driving x 6 months from last episode per [place] law  Will f/u above testing once completed-please call if further questions/concerns    Thank you very much for this referral. I appreciate the opportunity to participate in this patient's care. History of Present Illness: This is a 48 y.o.   female, we were asked to see for syncope. PMH notable for thyroid CA, vasovagal syncope during pregnancy x 2.   Per patient, she was eating dinner yesterday when she experienced rather acute onset dizziness/vertigo followed by tunneling of vision and LOC<1 minute. Her family noted possible head/body twitching lasting <10 seconds. No preceding nausea, palpitations reported, although she did have emesis x 1 during the event as well as urinary incontinence per pt. There was no post event prolonged confusion or fatigue. She endorsed a slight headache afterwards. No focal weakness, numbness, speech/vision deficits. She denies a h/o seizure d/o, FHx seizures, head trauma, CNS infections. She recently started HCG injections in the AM for weight loss. CT head on admission did not reveal any acute process. She is presently back to her baseline without recurrence of events. Overnight, she was noted with bradycardia and relative hypotension. She endorsed dizziness/lightheadedness as well. She states she is well hydrated drinking >100 fl oz of water daily. EKG this admission NSR. TTE is pending. EEG also ordered on admission and pending. Allergies   Allergen Reactions    Azithromycin Other (comments)     Upset stomach        Prior to Admission medications    Medication Sig Start Date End Date Taking? Authorizing Provider   guaiFENesin-codeine (ROBITUSSIN AC) 100-10 mg/5 mL solution Take 10 mL by mouth four (4) times daily as needed for Cough. Max Daily Amount: 40 mL. 19   Schutrumpf, Mendel Rivers, MD   phentermine 30 mg capsule Take 1 Cap by mouth every morning. Max Daily Amount: 30 mg. 10/1/18   Ravinder Hernandez MD   venlafaxine-SR [facility] 75 mg capsule Take 1 Cap by mouth daily.  8/3/18   Ravinder Hernandez MD   hydroCHLOROthiazide (MICROZIDE) 12.5 mg capsule TAKE 1 CAPSULE BY MOUTH EVERY DAY AS NEEDED FOR SEVERE EDEMA 18   Ravinder Hernandez MD   SYNTHROID 150 mcg tablet TAKE 1 TABLET EVERY DAY 17   Provider, Historical       Past Medical History:   Diagnosis Date    Thyroid cancer ([facility] [address]     @ 24years old        Past Surgical History:   Procedure Laterality Date    HX  SECTION      HX THYROIDECTOMY      HX TOTAL LAPAROSCOPIC HYSTERECTOMY          Social History     Tobacco Use    Smoking status: Never Smoker    Smokeless tobacco: Never Used   Substance Use Topics    Alcohol use: Yes     Comment: socially- 3 or 4 days a week        History reviewed. No pertinent family history. Review of Systems:   Comprehensive review of systems performed and negative except for as listed above. Exam:     Visit Vitals  /71 (BP 1 Location: Right arm, BP Patient Position: Standing)   Pulse 64   Temp 97.7 °F (36.5 °C)   Resp 15   Ht 5' 5\" (1.651 m)   Wt 89.4 kg (197 lb 3.2 oz)   SpO2 94%   Breastfeeding? No   BMI 32.82 kg/m²        General: Well developed, well nourished. Patient in no apparent distress   Head: Normocephalic, atraumatic, anicteric sclera   Lungs:  Clear to auscultation bilaterally, No wheezes or rubs   Cardiac: Regular rate and rhythm with no murmurs. Abd: Bowel sounds were audible. No tenderness on palpation   Ext: No pedal edema   Skin: No overt signs of rash     Neurological Exam:  Mental Status: Alert and oriented to person place and time   Speech: Fluent no aphasia or dysarthria. Cranial Nerves:   Intact visual fields. Facial sensation is normal. Facial movement is symmetric. Palate is midline. Normal sternocleidomastoid strength. Tongue is midline. Hearing is intact bilaterally. Eyes: PERRL, EOM's full, no nystagmus, no ptosis. Motor:  Full and symmetric strength of upper and lower proximal and distal muscles. Normal bulk and tone. Reflexes:   Deep tendon reflexes 2+/4 and symmetrical.  Plantar response is downgoing b/l. Sensory:   Symmetrically intact  with no perceived deficits modalities involving small or large fibers. Gait:  Gait is deferred   Tremor:   No tremor noted. Cerebellar:  Finger to nose and heel over shin to knee was demonstrated competently. Neurovascular: No carotid bruits. Imaging  CT Results (maximum last 3):   Results from Hospital Encounter encounter on 04/27/19   CT HEAD WO CONT    Narrative INDICATION: syncope, shaking, dizziness     Exam: Noncontrast CT of the brain is performed with 5 mm collimation. CT dose reduction was achieved with the use of the standardized protocol  tailored for this examination and automatic exposure control for dose  modulation. Adaptive statistical iterative reconstruction (ASIR) was utilized. Direct comparison is made to prior CT dated November 2017. FINDINGS: There is no acute intracranial hemorrhage, mass, mass effect or  herniation. Ventricular system is normal. The gray-white matter differentiation  is well-preserved. The mastoid air cells are well pneumatized. The visualized  paranasal sinuses are normal.      Impression IMPRESSION: No acute intracranial hemorrhage, mass or infarct. MRI Results (maximum last 3): No results found for this or any previous visit. Lab Review  Lab Results   Component Value Date/Time    WBC 7.7 04/27/2019 11:09 PM    HCT 37.9 04/27/2019 11:09 PM    HGB 12.1 04/27/2019 11:09 PM    PLATELET 879 35/48/5450 11:09 PM     Lab Results   Component Value Date/Time    Sodium 138 04/27/2019 11:09 PM    Potassium 3.4 (L) 04/27/2019 11:09 PM    Chloride 106 04/27/2019 11:09 PM    CO2 25 04/27/2019 11:09 PM    Glucose 96 04/27/2019 11:09 PM    BUN 23 (H) 04/27/2019 11:09 PM    Creatinine 0.84 04/27/2019 11:09 PM    Calcium 8.7 04/27/2019 11:09 PM     No components found for: TROPQUANT  No results found for: ADRIANNA    Signed:  Feliz Marquis DO  4/28/2019  12:50 PM

## 2019-04-28 NOTE — PROGRESS NOTES
Care Management - Met with patient in the room. She lives with her  and is independent with her ADLs. Explained state observation notice. Patient signed form. DME: none ADLs:independent, works [facility]acy: [facility] Previous HH: none Previous SNF/rehab: none Insurance: [employer] PPO 
ER Contacts:  Consuelo Spear ([phone]) Reason for Admission:   syncope RRAT Score:          5 Plan for utilizing home health:      No needs. Current Advanced Directive/Advance Care Plan: not on file Likelihood of Readmission:  low Transition of Care Plan:            Home with office follow up Care Management Interventions PCP Verified by CM: Yes(Dr. Boy Quintanilla [phone]) Palliative Care Criteria Met (RRAT>21 & CHF Dx)?: No 
Mode of Transport at Discharge: Other (see comment)(family) Transition of Care Consult (CM Consult): Discharge Planning Discharge Durable Medical Equipment: No 
Physical Therapy Consult: No 
Occupational Therapy Consult: No 
Speech Therapy Consult: No 
Current Support Network: Lives with Spouse Confirm Follow Up Transport: Self Plan discussed with Pt/Family/Caregiver: Yes [employer] Information Provided?: No 
Discharge Location Discharge Placement: Home [facility]

## 2019-04-28 NOTE — H&P
HISTORY AND PHYSICAL 
 
 
PCP: Dorota Laws MD 
History source: the patient and her  CC: syncope HPI: 48 y.o lady w/ a remote hx of thyroid CA who presents after a syncopal episode. She doesn't recall the details of the events. She was at a restaurant and shortly after eating developed intense dizziness described as room spinning, lasting a few seconds, followed by loss of consciousness that lasted under a minute. She then vomited while unconscious and awakened, and began \"seizing\" per the . He describes her body shaking but not her extremities. They are unsure of any incontinence. This lasted under a minute, and after it resolved the patient was confused and lethargic for at least 10 minutes. No prior history of syncope, no palpitations, no chest pain, no dyspnea, no history of heart disease or seizures. ROS is otherwise negative. PMH/PSH: 
Past Medical History:  
Diagnosis Date  Thyroid cancer [facility])   
 @ 24years old Past Surgical History:  
Procedure Laterality Date  HX  SECTION    
 HX THYROIDECTOMY  HX TOTAL LAPAROSCOPIC HYSTERECTOMY Home meds:  
Prior to Admission medications Medication Sig Start Date End Date Taking? Authorizing Provider  
guaiFENesin-codeine (ROBITUSSIN AC) 100-10 mg/5 mL solution Take 10 mL by mouth four (4) times daily as needed for Cough. Max Daily Amount: 40 mL. 19   Marie Cornelius MD  
phentermine 30 mg capsule Take 1 Cap by mouth every morning. Max Daily Amount: 30 mg. 10/1/18   Dorota Laws MD  
venlafaxine-SR [facility] 75 mg capsule Take 1 Cap by mouth daily. 8/3/18   Dorota Laws MD  
hydroCHLOROthiazide (MICROZIDE) 12.5 mg capsule TAKE 1 CAPSULE BY MOUTH EVERY DAY AS NEEDED FOR SEVERE EDEMA 18   Dorota Laws MD  
SYNTHROID 150 mcg tablet TAKE 1 TABLET EVERY DAY 17   Provider, Historical  
 
 
Allergies: Allergies Allergen Reactions  Azithromycin Other (comments) Upset stomach FH: 
History reviewed. No pertinent family history. SH: Social History Tobacco Use  Smoking status: Never Smoker  Smokeless tobacco: Never Used Substance Use Topics  Alcohol use: Yes Comment: socially- 3 or 4 days a week ROS: A comprehensive review of systems was negative except for that written in the HPI. PHYSICAL EXAM: 
Visit Vitals /63 Resp 16 SpO2 97% Gen: NAD, non-toxic HEENT: anicteric sclerae, normal conjunctiva, oropharynx clear, MMM, no evidence of tongue laceration Neck: supple, trachea midline, no adenopathy Heart: RRR, no MRG, no JVD, no peripheral edema Lungs: CTA b/l, non-labored respirations Abd: soft, NT, ND, BS+, no organomegaly Extr: warm Skin: dry, no rash Neuro: CN II-XII grossly intact, normal speech, moves all extremities Psych: normal mood, appropriate affect Labs/Imaging: 
Recent Results (from the past 24 hour(s)) CBC WITH AUTOMATED DIFF Collection Time: 04/27/19 11:09 PM  
Result Value Ref Range WBC 7.7 3.6 - 11.0 K/uL  
 RBC 4.03 3.80 - 5.20 M/uL  
 HGB 12.1 11.5 - 16.0 g/dL HCT 37.9 35.0 - 47.0 % MCV 94.0 80.0 - 99.0 FL  
 MCH 30.0 26.0 - 34.0 PG  
 MCHC 31.9 30.0 - 36.5 g/dL  
 RDW 12.6 11.5 - 14.5 % PLATELET 905 569 - 230 K/uL MPV 11.1 8.9 - 12.9 FL  
 NRBC 0.0 0  WBC ABSOLUTE NRBC 0.00 0.00 - 0.01 K/uL NEUTROPHILS 52 32 - 75 % LYMPHOCYTES 40 12 - 49 % MONOCYTES 5 5 - 13 % EOSINOPHILS 2 0 - 7 % BASOPHILS 1 0 - 1 % IMMATURE GRANULOCYTES 0 0.0 - 0.5 % ABS. NEUTROPHILS 4.0 1.8 - 8.0 K/UL  
 ABS. LYMPHOCYTES 3.1 0.8 - 3.5 K/UL  
 ABS. MONOCYTES 0.4 0.0 - 1.0 K/UL  
 ABS. EOSINOPHILS 0.2 0.0 - 0.4 K/UL  
 ABS. BASOPHILS 0.1 0.0 - 0.1 K/UL  
 ABS. IMM. GRANS. 0.0 0.00 - 0.04 K/UL  
 DF AUTOMATED    
CK W/ REFLX CKMB Collection Time: 04/27/19 11:09 PM  
Result Value Ref Range   26 - 192 U/L  
MAGNESIUM  
 Collection Time: 04/27/19 11:09 PM  
Result Value Ref Range Magnesium 2.0 1.6 - 2.4 mg/dL METABOLIC PANEL, BASIC Collection Time: 04/27/19 11:09 PM  
Result Value Ref Range Sodium 138 136 - 145 mmol/L Potassium 3.4 (L) 3.5 - 5.1 mmol/L Chloride 106 97 - 108 mmol/L  
 CO2 25 21 - 32 mmol/L Anion gap 7 5 - 15 mmol/L Glucose 96 65 - 100 mg/dL BUN 23 (H) 6 - 20 MG/DL Creatinine 0.84 0.55 - 1.02 MG/DL  
 BUN/Creatinine ratio 27 (H) 12 - 20 GFR est AA >60 >60 ml/min/1.73m2 GFR est non-AA >60 >60 ml/min/1.73m2 Calcium 8.7 8.5 - 10.1 MG/DL  
SAMPLES BEING HELD Collection Time: 04/27/19 11:09 PM  
Result Value Ref Range SAMPLES BEING HELD 1RED, 1BLU   
 COMMENT Add-on orders for these samples will be processed based on acceptable specimen integrity and analyte stability, which may vary by analyte. TROPONIN I Collection Time: 04/27/19 11:09 PM  
Result Value Ref Range Troponin-I, Qt. <0.05 <0.05 ng/mL HEPATIC FUNCTION PANEL Collection Time: 04/27/19 11:09 PM  
Result Value Ref Range Protein, total 6.7 6.4 - 8.2 g/dL Albumin 3.5 3.5 - 5.0 g/dL Globulin 3.2 2.0 - 4.0 g/dL A-G Ratio 1.1 1.1 - 2.2 Bilirubin, total 0.3 0.2 - 1.0 MG/DL Bilirubin, direct <0.1 0.0 - 0.2 MG/DL Alk. phosphatase 53 45 - 117 U/L  
 AST (SGOT) 13 (L) 15 - 37 U/L  
 ALT (SGPT) 27 12 - 78 U/L  
EKG, 12 LEAD, INITIAL Collection Time: 04/27/19 11:24 PM  
Result Value Ref Range Ventricular Rate 60 BPM  
 Atrial Rate 60 BPM  
 P-R Interval 152 ms QRS Duration 86 ms  
 Q-T Interval 482 ms QTC Calculation (Bezet) 482 ms Calculated P Axis 49 degrees Calculated T Axis 28 degrees Diagnosis Normal sinus rhythm Prolonged QT When compared with ECG of 18-NOV-2017 21:27, No significant change was found Recent Labs  
  04/27/19 
2309 WBC 7.7 HGB 12.1 HCT 37.9  Recent Labs  
  04/27/19 
2309   
K 3.4*  
 CO2 25  
 BUN 23* CREA 0.84 GLU 96  
CA 8.7 MG 2.0 Recent Labs  
  04/27/19 
2309 SGOT 13* ALT 27 AP 53 TBILI 0.3 TP 6.7 ALB 3.5 GLOB 3.2 Recent Labs  
  04/27/19 2309 TROIQ <0.05 No results for input(s): INR, PTP, APTT in the last 72 hours. No lab exists for component: INREXT No results for input(s): PH, PCO2, PO2 in the last 72 hours. Ct Head Wo Cont Result Date: 4/27/2019 IMPRESSION: No acute intracranial hemorrhage, mass or infarct. Xr Chest [facility] Result Date: 4/27/2019 IMPRESSION: No acute cardiopulmonary disease. Assessment & Plan:  
 
Syncope: unclear etiology, possible seizure given description as well as what sounds like a post-ictal state 
-admit to observation 
-tele monitoring 
-echocardiogram 
-EEG and will consult neurology given history -QTc slightly prolonged on ECG 
-check orthostatic BP 
-on Effexor which can reduce seizure threshold; will hold for now Hypothyroidism: 
-check TSH 
-continue synthroid Hypokalemia:  
-replete Recently started on phentermine DVT ppx: sq Lovenox Code status: full Disposition: home likely later in AM 
 
Signed By: Radha Calero MD   
 April 28, 2019

## 2019-04-28 NOTE — ED NOTES
TRANSFER - OUT REPORT: 
 
Verbal report given to Karen(name) on Jermaine  being transferred to NSTU(unit) for routine progression of care Report consisted of patients Situation, Background, Assessment and  
Recommendations(SBAR). Information from the following report(s) SBAR, ED Summary and Cardiac Rhythm NSR was reviewed with the receiving nurse. Lines:    
 
Opportunity for questions and clarification was provided. Patient transported with: 
 Registered Nurse

## 2019-04-28 NOTE — PROGRESS NOTES
Patient's BP on arrival to unit was low in the upper 80s/50s. Called to notify MD Farley who had admitted the patient. Did not get a response from him until over 45 minutes later when he called back and was updated on the patient's situation. Last BP checked at 0353 was 97/66/ Fluid has been started at 150 Ml/hr with some improvement in BP. He advised to just keep the fluid running and continue to monitor the BP. Called back to MD to report persistent low BP. No additional orders given. I was to check the vitals less frequently and allow the fluid to run. The patient, meanwhile, is not experiencing any symptoms and remains calm and is resting in bed.

## 2019-04-29 ENCOUNTER — APPOINTMENT (OUTPATIENT)
Dept: NON INVASIVE DIAGNOSTICS | Age: 54
End: 2019-04-29
Attending: HOSPITALIST
Payer: COMMERCIAL

## 2019-04-29 VITALS
TEMPERATURE: 97.8 F | BODY MASS INDEX: 31.66 KG/M2 | SYSTOLIC BLOOD PRESSURE: 116 MMHG | DIASTOLIC BLOOD PRESSURE: 74 MMHG | WEIGHT: 197 LBS | HEIGHT: 66 IN | HEART RATE: 67 BPM | OXYGEN SATURATION: 98 % | RESPIRATION RATE: 16 BRPM

## 2019-04-29 LAB
ECHO AO ROOT DIAM: 3.22 CM
ECHO LA MAJOR AXIS: 3.22 CM
ECHO LA TO AORTIC ROOT RATIO: 1
ECHO LV E' LATERAL VELOCITY: 8.94 CM/S
ECHO LV E' SEPTAL VELOCITY: 5.41 CM/S
ECHO LV INTERNAL DIMENSION DIASTOLIC: 5.11 CM (ref 3.9–5.3)
ECHO LV INTERNAL DIMENSION SYSTOLIC: 3.11 CM
ECHO LV IVSD: 0.89 CM (ref 0.6–0.9)
ECHO LV MASS 2D: 186.6 G (ref 67–162)
ECHO LV MASS INDEX 2D: 93.9 G/M2 (ref 43–95)
ECHO LV POSTERIOR WALL DIASTOLIC: 0.88 CM (ref 0.6–0.9)
ECHO MV A VELOCITY: 52.3 CM/S
ECHO MV AREA PHT: 4.6 CM2
ECHO MV E DECELERATION TIME (DT): 166.3 MS
ECHO MV E VELOCITY: 79.62 CM/S
ECHO MV E/A RATIO: 1.52
ECHO MV E/E' LATERAL: 8.91
ECHO MV E/E' RATIO (AVERAGED): 11.81
ECHO MV E/E' SEPTAL: 14.72
ECHO MV PRESSURE HALF TIME (PHT): 48.2 MS

## 2019-04-29 PROCEDURE — 99218 HC RM OBSERVATION: CPT

## 2019-04-29 PROCEDURE — 74011250637 HC RX REV CODE- 250/637: Performed by: INTERNAL MEDICINE

## 2019-04-29 PROCEDURE — 96361 HYDRATE IV INFUSION ADD-ON: CPT

## 2019-04-29 PROCEDURE — 93306 TTE W/DOPPLER COMPLETE: CPT

## 2019-04-29 PROCEDURE — 74011250636 HC RX REV CODE- 250/636: Performed by: HOSPITALIST

## 2019-04-29 PROCEDURE — 96372 THER/PROPH/DIAG INJ SC/IM: CPT

## 2019-04-29 RX ORDER — LEVOTHYROXINE SODIUM 137 UG/1
137 TABLET ORAL
COMMUNITY
End: 2021-01-27 | Stop reason: DRUGHIGH

## 2019-04-29 RX ADMIN — Medication 10 ML: at 13:14

## 2019-04-29 RX ADMIN — LEVOTHYROXINE SODIUM 137 MCG: 112 TABLET ORAL at 06:11

## 2019-04-29 RX ADMIN — Medication 10 ML: at 06:12

## 2019-04-29 RX ADMIN — SODIUM CHLORIDE AND POTASSIUM CHLORIDE: 9; 2.98 INJECTION, SOLUTION INTRAVENOUS at 09:58

## 2019-04-29 RX ADMIN — ENOXAPARIN SODIUM 40 MG: 40 INJECTION SUBCUTANEOUS at 10:00

## 2019-04-29 NOTE — PROCEDURES
Patient Name: Alejandra Fan  : 1965  Age: 48 y.o. Ordering physician: No ref. provider found  Date of EE19  Interpreting physician: Viv Alvarez DO      ELECTROENCEPHALOGRAM REPORT     PROCEDURE: EEG. CLINICAL INDICATION: The patient is a 48 y.o. female who is being evaluated for baseline electro cerebral activities and to rule out seizure focus. EEG CLASSIFICATION: Essentially normal awake EEG    DESCRIPTION OF THE RECORD:   The background of this recording contains a posteriorly-located occipital alpha rhythm of 9 Hz that attenuates with eye opening. Throughout the recording, there were no clear areas of focal slowing nor spike or spike-and-wave discharges seen. Hyperventilation and photic stimulation were not performed. During the recording the patient did not achieve stage II sleep    INTERPRETATION: This is a normal electroencephalogram with the patient   awake showing no clear focal abnormalities or epileptiform   activity. A normal EEG doesn't not rule out seizures. Clinical correlation recommended.       Viv Alvarez DO  Diplomate, American Board of Psychiatry & Neurology (Neurology)

## 2019-04-29 NOTE — DISCHARGE INSTRUCTIONS
Discharge Instructions       PATIENT ID: Anthony Miller  MRN: [id number]   YOB: 1965    DATE OF ADMISSION: 4/27/2019 10:37 PM    DATE OF DISCHARGE: 4/29/2019    PRIMARY CARE PROVIDER: Vick Sellers MD     ATTENDING PHYSICIAN: Umer Duong DO  DISCHARGING PROVIDER: Carlton Kim DO    To contact this individual call [phone] and ask the  to page. If unavailable ask to be transferred the Adult Hospitalist Department. DISCHARGE DIAGNOSES     Syncope    CONSULTATIONS: IP CONSULT TO NEUROLOGY    PROCEDURES/SURGERIES: * No surgery found *    PENDING TEST RESULTS:   At the time of discharge the following test results are still pending: none    FOLLOW UP APPOINTMENTS:   Follow-up Information     Follow up With Specialties Details Why Contact Info    Vick Sellers MD Internal Medicine   [address] [place] [address] Dr Sohail javier [address] [place] [address]  [phone]             ADDITIONAL CARE RECOMMENDATIONS:     Resume home medications    Recommend stopping HCG injections    Please follow up with your primary care physician within 1 week of discharge from the hospital     Please adhere to syncope precautions, including no driving for 6 months. Please drink plenty of fluids       DISCHARGE MEDICATIONS:   See Medication Reconciliation Form    · It is important that you take the medication exactly as they are prescribed. · Keep your medication in the bottles provided by the pharmacist and keep a list of the medication names, dosages, and times to be taken in your wallet. · Do not take other medications without consulting your doctor. NOTIFY YOUR PHYSICIAN FOR ANY OF THE FOLLOWING:   Fever over 101 degrees for 24 hours. Chest pain, shortness of breath, fever, chills, nausea, vomiting, diarrhea, change in mentation, falling, weakness, bleeding. Severe pain or pain not relieved by medications.   Or, any other signs or symptoms that you may have questions about.        Signed:   Vinicius Olivares DO  4/29/2019  4:16 PM    Patient Education        Vasovagal Syncope: Care Instructions  Your Care Instructions    Vasovagal syncope (say \"axt-zeg-XXV-ganesh MICHAUDYPTT-mav-kjd\")is sudden dizziness or fainting that can be set off by things such as pain, stress, fear, or trauma. You may sweat or feel lightheaded, sick to your stomach, or tingly. The problem causes the heart rate to slow and the blood vessels to widen, or dilate, for a short time. When this happens, blood pools in the lower body, and less blood goes to the brain. You can usually get relief by lying down with your legs raised (elevated). This helps more blood to flow to your brain and may help relieve symptoms like feeling dizzy. Some doctors may recommend a technique that involves tensing your fists and arms. This type of fainting is often easy to predict. For example, it happens to some people when they see blood or have to get a shot. They may feel symptoms before they faint. An episode of vasovagal syncope usually responds well to self-care. Other treatment often isn't needed. But if the fainting keeps happening, your doctor may suggest further treatments. Follow-up care is a key part of your treatment and safety. Be sure to make and go to all appointments, and call your doctor if you are having problems. It's also a good idea to know your test results and keep a list of the medicines you take. How can you care for yourself at home? · Drink plenty of fluids to prevent dehydration. If you have kidney, heart, or liver disease and have to limit fluids, talk with your doctor before you increase your fluid intake. · Try to avoid things that you think may set off vasovagal syncope. · Talk to your doctor about any medicines you take. Some medicines may increase the chance of this condition occurring. · If you feel symptoms, lie down with your legs raised.  Talk to your doctor about what to do if your symptoms come back.  When should you call for help? Call 911 anytime you think you may need emergency care. For example, call if:    · You have symptoms of a heart problem. These may include:  ? Chest pain or pressure. ? Severe trouble breathing. ? A fast or irregular heartbeat.    Watch closely for changes in your health, and be sure to contact your doctor if:    · You have more episodes of fainting at home.     · You do not get better as expected. Where can you learn more? Go to http://regina-angel.info/. Enter L754 in the search box to learn more about \"Vasovagal Syncope: Care Instructions. \"  Current as of: September 23, 2018  Content Version: 11.9  © 0343-5083 [employer]. Care instructions adapted under license by [employer] (which disclaims liability or warranty for this information). If you have questions about a medical condition or this instruction, always ask your healthcare professional. [place] [address] any warranty or liability for your use of this information.

## 2019-04-29 NOTE — PROGRESS NOTES
Neurology Progress Note Patient ID: 
Nikkie Posada 
[id number] 
3 y.o. 
1965 Chief Complaint:Syncope Subjective:  
48 y.o. female with past medical history significant for CA (Thyroid, s/p thyroidectomy) who presents to the ED after syncopal episode wile at dinner. She had taken just a few bites of dinner. She began experiencing nausea, and last recalls the sensation of the room spinning, before she regained consciousness. She states that she woke vomiting and felt disoriented. CT of the head, Unremarkable. MRI negative. EEG pending Today she is doing well up in the room with her daughter in the guest.  She reports that she feels back to baseline. She complains of slight chest soreness when she takes deep breaths and she is doing fine. EEG ending. Objective: All records in [facility] reviewed and noted ROS: 
Per HPI All other 12 pt ROS negative Meds: 
Current Facility-Administered Medications Medication Dose Route Frequency  sodium chloride (NS) flush 5-40 mL  5-40 mL IntraVENous Q8H  
 sodium chloride (NS) flush 5-40 mL  5-40 mL IntraVENous PRN  
 acetaminophen (TYLENOL) tablet 650 mg  650 mg Oral Q4H PRN  
 enoxaparin (LOVENOX) injection 40 mg  40 mg SubCUTAneous Q24H  
 0.9% sodium chloride with KCl 40 mEq/L infusion   IntraVENous CONTINUOUS  
 levothyroxine (SYNTHROID) tablet 137 mcg  137 mcg Oral 6am  
 
 
Imaging: EEG- pending CT/CTA if the head and neck- unremarkable Echo- pending Lab Review No results found for this or any previous visit (from the past 24 hour(s)). Exam: 
Visit Vitals /73 (BP 1 Location: Right arm, BP Patient Position: At rest) Pulse 64 Temp 97.9 °F (36.6 °C) Resp 13 Ht 5' 5\" (1.651 m) Wt 89.6 kg (197 lb 9.6 oz) SpO2 94% Breastfeeding? No  
BMI 32.88 kg/m² Gen: Well developed CV: RRR Lungs: non labored breathing Neuro: A&O x 3, no dysarthria or aphasia CN II-XII: PERRL, EOMI, face symmetric, tongue/palate midline Motor: strength 5/5 all four ext Gait:deferred Assessment:  
 
Patient Active Problem List  
Diagnosis Code  Stasis edema of both lower extremities I87.303  Anxiety F41.9  Acquired hypothyroidism E03.9  Syncope R55 Plan:  
45-year-old female presented to ED with syncopal event at around dinnertime. Work-up so far has been unremarkable. EEG, Unremarkable. Suspect vasovagal syncope. Syncope precautions including no driving x 6 months from last episode per [place] law. This was discussed today. Thank you very much for this consultation. No further neurologic recommendations at this time. Will sign off but please call with questions.  
 
Signed: 
Luz Elena Deras NP 
4/29/2019 
11:17 AM

## 2019-04-29 NOTE — PROGRESS NOTES
Problem: Falls - Risk of 
Goal: *Absence of Falls Description Document Mary Da Fall Risk and appropriate interventions in the flowsheet. Outcome: Progressing Towards Goal 
  
Problem: Syncope Goal: Decrease or eliminate episodes of syncope Outcome: Progressing Towards Goal 
  
Problem: Patient Education: Go to Patient Education Activity Goal: Patient/Family Education Outcome: Resolved/Met

## 2019-04-29 NOTE — PROGRESS NOTES
I have reviewed discharge instructions with the patient and spouse. The patient and spouse verbalized understanding. Bedside RN performed patient education and medication education. Discharge concerns initiated and discussed with patient, including clarification on \"who\" assists the patient at their home and instructions for when the home going patient should call their provider after discharge. Opportunity for questions and clarification was provided. Patient receptive to education: YES Patient stated: Carson French Understanding Barriers to Education: None Diagnosis Education given:  YES Length of stay: 3 Expected Day of Discharge: 4/29/2019 Ask if they have \"Help at Home\" & add to white board? YES Education Day #: 3 Pt aware of [facility] survey: YES

## 2019-04-29 NOTE — PROGRESS NOTES
Participated in IDR rounds this morning. Patient has scheduled ECHO and EEG today. Patient expected to discharge home when medically cleared. Family will provide transportation. CM will monitor. JOSE Barajas,CRM

## 2019-04-29 NOTE — DISCHARGE SUMMARY
Discharge Summary       PATIENT ID: Nikkie Posada  MRN: [id number]   YOB: 1965    DATE OF ADMISSION: 4/27/2019 10:37 PM    DATE OF DISCHARGE: 4/29/2019  PRIMARY CARE PROVIDER: Elma Cleary MD     ATTENDING PHYSICIAN: William Donohue DO   DISCHARGING PROVIDER: William Donohue DO    To contact this individual call [phone] and ask the  to page. If unavailable ask to be transferred the Adult Hospitalist Department. CONSULTATIONS: IP CONSULT TO NEUROLOGY    PROCEDURES/SURGERIES: * No surgery found *    ADMITTING DIAGNOSES & HOSPITAL COURSE:       48year old female with past medical history presenting to [facility] with syncope. Patient was at [address] [place] [address] and developed dizziness followed by loss of consciousness. She was observed on telemetry for further evaluation. Neurology was consulted. Initially patient was hypotensive on admission and improved with IVFs. Orthostatics were negative. MRI, EEG, echo were completed and did not reveal underlying etiology of syncope. Suspect due to vasovagal. Patient stable to discharge home with outpatient follow up with primary care physician. The completed tests were explained to patient and  in detail. She was instructed to not drive for 6 months. MRI brain:  IMPRESSION: Normal MRI of the head. Echo:  · Normal cavity size, wall thickness, systolic function (ejection fraction normal) and diastolic function. The estimated ejection fraction is 56 - 60%. EEG:  INTERPRETATION: This is a normal electroencephalogram with the patient   awake showing no clear focal abnormalities or epileptiform   activity. A normal EEG doesn't not rule out seizures. Clinical correlation recommended.         DISCHARGE DIAGNOSES / PLAN:      Syncope:   -Suspect vasovagal.   -Hypotensive on admission with improvement with IVFs  -Neurology consulted during admission  -MRI brain negative  -EEG negative  -Orthostatics negative   -Echo with normal EF   -Driving restrictions, no driving for 6 months per [place] state law    Hypotension: resolved with IVFs    Hypothyroidism:   -continue home levothyroxine, further adjustments as outpatient   -TSH 0.05, T4 1.1       ADDITIONAL CARE RECOMMENDATIONS:    Resume home medications    Recommend stopping HCG injections    Please follow up with your primary care physician within 1 week of discharge from the hospital     Please adhere to syncope precautions, including no driving for 6 months. Please drink plenty of fluids     PENDING TEST RESULTS:   At the time of discharge the following test results are still pending: none    FOLLOW UP APPOINTMENTS:    Follow-up Information     Follow up With Specialties Details Why Contact Info    Kole Yanez MD Internal Medicine   [address] [place] [address] Dr Sauceda [facility] [address] [phone]                 DISCHARGE MEDICATIONS:  Current Discharge Medication List      CONTINUE these medications which have NOT CHANGED    Details   levothyroxine (SYNTHROID) 137 mcg tablet Take 137 mcg by mouth Daily (before breakfast). venlafaxine-SR (EFFEXOR-XR) 75 mg capsule Take 1 Cap by mouth daily. Qty: 30 Cap, Refills: 5    Associated Diagnoses: Anxiety      hydroCHLOROthiazide (MICROZIDE) 12.5 mg capsule TAKE 1 CAPSULE BY MOUTH EVERY DAY AS NEEDED FOR SEVERE EDEMA  Qty: 90 Cap, Refills: 1    Associated Diagnoses: Stasis edema of both lower extremities      guaiFENesin-codeine (ROBITUSSIN AC) 100-10 mg/5 mL solution Take 10 mL by mouth four (4) times daily as needed for Cough. Max Daily Amount: 40 mL. Qty: 200 mL, Refills: 0    Associated Diagnoses: Acute bronchiolitis due to unspecified organism         STOP taking these medications       phentermine 30 mg capsule Comments:   Reason for Stopping:                 NOTIFY YOUR PHYSICIAN FOR ANY OF THE FOLLOWING:   Fever over 101 degrees for 24 hours.    Chest pain, shortness of breath, fever, chills, nausea, vomiting, diarrhea, change in mentation, falling, weakness, bleeding. Severe pain or pain not relieved by medications. Or, any other signs or symptoms that you may have questions about.     DISPOSITION:  x  Home With:   OT  PT  HH  RN       Long term SNF/Inpatient Rehab    Independent/assisted living    Hospice    Other:       PATIENT CONDITION AT DISCHARGE:     Functional status    Poor     Deconditioned    x Independent      Cognition   x  Lucid     Forgetful     Dementia      Catheters/lines (plus indication)    Stephens     PICC     PEG    x None      Code status   x  Full code     DNR      PHYSICAL EXAMINATION AT DISCHARGE:   Gen: NAD, non-toxic  Heart: RRR  Lungs: non-labored respirations  Abd: soft, NT  Extr: warm  Skin: dry, no rash  Neuro: moves all extremities  Psych: anxious              CHRONIC MEDICAL DIAGNOSES:  Problem List as of 4/29/2019 Date Reviewed: 1/22/2019          Codes Class Noted - Resolved    Syncope ICD-10-CM: R55  ICD-9-CM: 780.2  4/28/2019 - Present        Acquired hypothyroidism ICD-10-CM: E03.9  ICD-9-CM: 244.9  8/3/2018 - Present        Anxiety ICD-10-CM: F41.9  ICD-9-CM: 300.00  5/15/2018 - Present        Stasis edema of both lower extremities ICD-10-CM: I87.303  ICD-9-CM: 459.30  1/16/2018 - Present              Greater than 30 minutes were spent with the patient on counseling and coordination of care    Signed:   Milo Sharp DO  4/29/2019  4:18 PM

## 2020-06-22 ENCOUNTER — OFFICE VISIT (OUTPATIENT)
Dept: SURGERY | Age: 55
End: 2020-06-22

## 2020-06-22 VITALS
HEIGHT: 65 IN | TEMPERATURE: 97.6 F | DIASTOLIC BLOOD PRESSURE: 70 MMHG | WEIGHT: 197 LBS | BODY MASS INDEX: 32.82 KG/M2 | SYSTOLIC BLOOD PRESSURE: 116 MMHG | HEART RATE: 67 BPM

## 2020-06-22 DIAGNOSIS — Z80.3 FAMILY HISTORY OF BREAST CANCER: ICD-10-CM

## 2020-06-22 DIAGNOSIS — Z15.02 MONOALLELIC MUTATION OF NBN GENE IN FEMALE: Primary | ICD-10-CM

## 2020-06-22 DIAGNOSIS — Z15.89 MONOALLELIC MUTATION OF NBN GENE IN FEMALE: Primary | ICD-10-CM

## 2020-06-22 DIAGNOSIS — Z15.01 MONOALLELIC MUTATION OF NBN GENE IN FEMALE: Primary | ICD-10-CM

## 2020-06-22 DIAGNOSIS — Z15.09 MONOALLELIC MUTATION OF NBN GENE IN FEMALE: Primary | ICD-10-CM

## 2020-06-22 RX ORDER — METFORMIN HYDROCHLORIDE 850 MG/1
TABLET ORAL 2 TIMES DAILY WITH MEALS
COMMUNITY
End: 2021-01-27

## 2020-06-22 NOTE — PROGRESS NOTES
HISTORY OF PRESENT ILLNESS  Caio Nassar is a 50 y.o. female. HPI  NEW patient presents for consultation at the request of Dr. 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 The patient has a strong FH of breast cancer in maternal cousins - one cousin diagnosed with breast cancer at age 43, another cousin diagnosed with breast cancer at age 27, and another cousin diagnosed with breast cancer at age 39. A maternal uncle was diagnosed with colon cancer at age 46. Four of these cousins had NBN mutations as well as BRCA 1 mutations. She has felt a RIGHT breast lump times about two months which is non-tender. Has no nipple discharge/retraction or skin change. BILATERAL diagnostic mammogram 2020, BIRADS 1, negative. Was given an order for a bilateral breast MRI, but has not done this yet. Pt also notes history of thyroid cancer in her 25s.       Past Medical History:   Diagnosis Date    Thyroid cancer ([facility] [address]     @ 24years old       Past Surgical History:   Procedure Laterality Date    HX  SECTION      HX THYROIDECTOMY      HX TOTAL LAPAROSCOPIC HYSTERECTOMY         Social History     Socioeconomic History    Marital status:      Spouse name: Not on file    Number of children: Not on file    Years of education: Not on file    Highest education level: Not on file   Occupational History    Not on file   Social Needs    Financial resource strain: Not on file    Food insecurity     Worry: Not on file     Inability: Not on file    Transportation needs     Medical: Not on file     Non-medical: Not on file   Tobacco Use    Smoking status: Never Smoker    Smokeless tobacco: Never Used   Substance and Sexual Activity    Alcohol use: Yes     Comment: socially- 3 or 4 days a week    Drug use: Not on file    Sexual activity: Not on file   Lifestyle    Physical activity     Days per week: Not on file     Minutes per session: Not on file    Stress: Not on file   Relationships    Social connections     Talks on phone: Not on file     Gets together: Not on file     Attends [employer] service: Not on file     Active member of club or organization: Not on file     Attends meetings of clubs or organizations: Not on file     Relationship status: Not on file    Intimate partner violence     Fear of current or ex partner: Not on file     Emotionally abused: Not on file     Physically abused: Not on file     Forced sexual activity: Not on file   Other Topics Concern    Not on file   Social History Narrative    Not on file       Current Outpatient Medications on File Prior to Visit   Medication Sig Dispense Refill    metFORMIN (GLUCOPHAGE) 850 mg tablet Take  by mouth two (2) times daily (with meals).  levothyroxine (SYNTHROID) 137 mcg tablet Take 137 mcg by mouth Daily (before breakfast).  [DISCONTINUED] guaiFENesin-codeine (ROBITUSSIN AC) 100-10 mg/5 mL solution Take 10 mL by mouth four (4) times daily as needed for Cough. Max Daily Amount: 40 mL. 200 mL 0    [DISCONTINUED] venlafaxine-SR (EFFEXOR-XR) 75 mg capsule Take 1 Cap by mouth daily. 30 Cap 5    [DISCONTINUED] hydroCHLOROthiazide (MICROZIDE) 12.5 mg capsule TAKE 1 CAPSULE BY MOUTH EVERY DAY AS NEEDED FOR SEVERE EDEMA 90 Cap 1     No current facility-administered medications on file prior to visit. Allergies   Allergen Reactions    Azithromycin Other (comments)     Upset stomach       OB History    No obstetric history on file. Obstetric Comments   Menarche 14, LMP 2020, # of children, age of 1st delivery 0, Hysterectomy/oophorectomy partial/YES 1, Breast bx NO, history of breast feeding YES, BCP YES, Hormone therapy NO             ROS  Constitutional: Negative. HENT: Negative. Eyes: Negative. Respiratory: Negative. Cardiovascular: Negative. Gastrointestinal: Negative. Genitourinary: Negative. Musculoskeletal: Negative. Skin: Negative. Neurological: Negative. Endo/Heme/Allergies: Negative. Psychiatric/Behavioral: Negative. Physical Exam  Exam conducted with a chaperone present. Cardiovascular:      Rate and Rhythm: Normal rate and regular rhythm. Heart sounds: Normal heart sounds. Pulmonary:      Breath sounds: Normal breath sounds. Chest:      Breasts: Breasts are symmetrical.         Right: Normal. No swelling, bleeding, inverted nipple, mass, nipple discharge, skin change or tenderness. Left: Normal. No swelling, bleeding, inverted nipple, mass, nipple discharge, skin change or tenderness. Lymphadenopathy:      Cervical:      Right cervical: No superficial, deep or posterior cervical adenopathy. Left cervical: No superficial, deep or posterior cervical adenopathy. Upper Body:      Right upper body: No supraclavicular or axillary adenopathy. Left upper body: No supraclavicular or axillary adenopathy. ASSESSMENT and PLAN    ICD-10-CM ICD-9-CM    1. 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 Physical exam today normal.    A total of  60 minutes were spent face-to-face with the patient during this encounter and over half of that time was spent on counseling and coordination of care. Reviewed family history of cancers and genetic testing. Breast cancer in pt's cousins is more likely related to their BRCA mutations than NBN mutation. Pt is most likely at lower lifetime risk with NBN mutation. Available data on NBN mutation is on deletion (657/del5) whereas the patient has a substitution mutation in this gene. Not sure if data correlates but data suggests 31% lifetime risk of BC to age 80 and risk starts to rise at about age 62 with a 4% risk at 62 compared to population risk of about 1.5%.  I don't think her risk is extraordinarily perfecto at this point but will begin to increase. Discussed aggressive screening options, as well as risk reduction, with diet and exercise, chemoprevention, or surgery. Further discussion of prophylactic skin and nipple sparing mastectomy (with incision above the inframammary fold), with surgical delay and nipple bx, followed 2-3 weeks later by mastectomy and probable direct implant placement by plastic surgeon. Discussed expected risk reduction with this surgery, as well as risks associated with the surgery itself. Pt is very anxious about risk. She is scheduled for hysterectomy and oophorectomy on 07/21/20 (she had 1 ovary removed after her children were born). Will order baseline breast MRI for prior to her surgery, and call with the results. F/U in 6 months. This plan was reviewed with the patient and patient agrees. All questions were answered.     Written by Savanna Burdick, as dictated by Dr. Edward Krause MD.

## 2020-06-22 NOTE — PROGRESS NOTES
HISTORY OF PRESENT ILLNESS Irene Templeton is a 50 y.o. female. HPI  NEW patient consult referred by  Dr. Robert Renteria for [facility] HSPTL mutation. [facility] genetics =  +NBN mutation Family History: 
 
 
Mammogram, [facility], 4/30/2020, BIRADS 1 Review of Systems All other systems reviewed and are negative. Physical Exam 
 
ASSESSMENT and PLAN 
{ASSESSMENT/PLAN:04993}

## 2020-06-22 NOTE — PATIENT INSTRUCTIONS
Breast Self-Exam: Care Instructions  Your Care Instructions     A breast self-exam is when you check your breasts for lumps or changes. This regular exam helps you learn how your breasts normally look and feel. Most breast problems or changes are not because of cancer. Breast self-exam is not a substitute for a mammogram. Having regular breast exams by your doctor and regular mammograms improve your chances of finding any problems with your breasts. Some women set a time each month to do a step-by-step breast self-exam. Other women like a less formal system. They might look at their breasts as they brush their teeth, or feel their breasts once in a while in the shower. If you notice a change in your breast, tell your doctor. Follow-up care is a key part of your treatment and safety. Be sure to make and go to all appointments, and call your doctor if you are having problems. It's also a good idea to know your test results and keep a list of the medicines you take. How do you do a breast self-exam?  · The best time to examine your breasts is usually one week after your menstrual period begins. Your breasts should not be tender then. If you do not have periods, you might do your exam on a day of the month that is easy to remember. · To examine your breasts:  ? Remove all your clothes above the waist and lie down. When you are lying down, your breast tissue spreads evenly over your chest wall, which makes it easier to feel all your breast tissue. ? Use the pads--not the fingertips--of the 3 middle fingers of your left hand to check your right breast. Move your fingers slowly in small coin-sized circles that overlap. ? Use three levels of pressure to feel of all your breast tissue. Use light pressure to feel the tissue close to the skin surface. Use medium pressure to feel a little deeper. Use firm pressure to feel your tissue close to your breastbone and ribs.  Use each pressure level to feel your breast tissue before moving on to the next spot. ? Check your entire breast, moving up and down as if following a strip from the collarbone to the bra line, and from the armpit to the ribs. Repeat until you have covered the entire breast.  ? Repeat this procedure for your left breast, using the pads of the 3 middle fingers of your right hand. · To examine your breasts while in the shower:  ? Place one arm over your head and lightly soap your breast on that side. ? Using the pads of your fingers, gently move your hand over your breast (in the strip pattern described above), feeling carefully for any lumps or changes. ? Repeat for the other breast.  · Have your doctor inspect anything you notice to see if you need further testing. Where can you learn more? Go to http://regina-angel.info/  Enter P148 in the search box to learn more about \"Breast Self-Exam: Care Instructions. \"  Current as of: August 22, 2019               Content Version: 12.5  © 3146-4519 [employer]. Care instructions adapted under license by [employer] (which disclaims liability or warranty for this information). If you have questions about a medical condition or this instruction, always ask your healthcare professional. [place] [address] any warranty or liability for your use of this information. Breast Self-Exam: Care Instructions  Your Care Instructions     A breast self-exam is when you check your breasts for lumps or changes. This regular exam helps you learn how your breasts normally look and feel. Most breast problems or changes are not because of cancer. Breast self-exam is not a substitute for a mammogram. Having regular breast exams by your doctor and regular mammograms improve your chances of finding any problems with your breasts. Some women set a time each month to do a step-by-step breast self-exam. Other women like a less formal system.  They might look at their breasts as they brush their teeth, or feel their breasts once in a while in the shower. If you notice a change in your breast, tell your doctor. Follow-up care is a key part of your treatment and safety. Be sure to make and go to all appointments, and call your doctor if you are having problems. It's also a good idea to know your test results and keep a list of the medicines you take. How do you do a breast self-exam?  · The best time to examine your breasts is usually one week after your menstrual period begins. Your breasts should not be tender then. If you do not have periods, you might do your exam on a day of the month that is easy to remember. · To examine your breasts:  ? Remove all your clothes above the waist and lie down. When you are lying down, your breast tissue spreads evenly over your chest wall, which makes it easier to feel all your breast tissue. ? Use the pads--not the fingertips--of the 3 middle fingers of your left hand to check your right breast. Move your fingers slowly in small coin-sized circles that overlap. ? Use three levels of pressure to feel of all your breast tissue. Use light pressure to feel the tissue close to the skin surface. Use medium pressure to feel a little deeper. Use firm pressure to feel your tissue close to your breastbone and ribs. Use each pressure level to feel your breast tissue before moving on to the next spot. ? Check your entire breast, moving up and down as if following a strip from the collarbone to the bra line, and from the armpit to the ribs. Repeat until you have covered the entire breast.  ? Repeat this procedure for your left breast, using the pads of the 3 middle fingers of your right hand. · To examine your breasts while in the shower:  ? Place one arm over your head and lightly soap your breast on that side. ?  Using the pads of your fingers, gently move your hand over your breast (in the strip pattern described above), feeling carefully for any lumps or changes. ? Repeat for the other breast.  · Have your doctor inspect anything you notice to see if you need further testing. Where can you learn more? Go to http://regina-angel.info/  Enter P148 in the search box to learn more about \"Breast Self-Exam: Care Instructions. \"  Current as of: August 22, 2019               Content Version: 12.5  © 2006-2020 [employer]. Care instructions adapted under license by [employer] (which disclaims liability or warranty for this information). If you have questions about a medical condition or this instruction, always ask your healthcare professional. [place] [address] any warranty or liability for your use of this information. MRI of the Breast: About This Test  What is it? MRI (magnetic resonance imaging) is a test that uses a magnetic field and pulses of radio wave energy to make pictures of the organs and structures inside the body. An MRI can give your doctor information about your breasts, chest wall, and underarm. When you have an MRI, you lie on a table and the table moves into the MRI machine. Why is this test done? An MRI of the breast can help find breast cancer and how far along it is (its stage). It can also look for infection. How do you prepare for the test?  In general, there's nothing you have to do before this test, unless your doctor tells you to. Tell your doctor if you get nervous in tight spaces. You may get a medicine to help you relax. If you think you'll get this medicine, be sure you have someone to take you home. How is the test done? · You may have contrast material (dye) put into your arm through a tube called an IV. · You will lie on a table that's part of the MRI scanner. · The table will slide into the space that contains the magnet. · Inside the scanner, you will hear a fan and feel air moving. You may hear tapping, thumping, or snapping noises.  You may be given earplugs or headphones to reduce the noise. · You will be asked to hold still during the scan. You may be asked to hold your breath for short periods. · You may be alone in the scanning room. But a technologist will watch through a window and talk with you during the test.  How does having an MRI of the breast feel? You won't have pain from the magnetic field or radio waves used for the MRI test. But you may be tired or sore from lying in one position for a long time. If a contrast material is used, you may feel some coolness when it is put into your IV. In rare cases, you may feel:  · Tingling in the mouth if you have metal dental fillings. · Warmth in the area being checked. This is normal. Tell the technologist if you have nausea, vomiting, a headache, dizziness, pain, burning, or breathing problems. How long does the test take? The test usually takes 30 to 60 minutes but can take as long as 2 hours. What are the risks of an MRI of the breast?  There are no known harmful effects from the strong magnetic field used for an MRI. But the magnet is very powerful. It may affect any metal implants or other medical devices you have. An MRI may be more likely than other tests to report a problem in the breast when a problem is not there (false-positive). A false-positive result may lead to more tests such as a biopsy when no serious problem is really present. So MRI is not used as a screening test for women at low or average risk for breast cancer. Risks from contrast material  Contrast material that contains gadolinium may be used in this test. But for most people, the benefit of its use in this test outweighs the risk. Be sure to tell your doctor if you have kidney problems or are pregnant. There is a slight chance of an allergic reaction if contrast material is used during the test. But most reactions are mild and can be treated using medicine.   If you breastfeed and are concerned about whether the contrast material used in this test is safe, talk to your doctor. Most experts believe that very little dye passes into breast milk and even less is passed on to the baby. But if you are concerned, you can stop breastfeeding for up to 24 hours after the test. During this time, you can give your baby breast milk that you stored before the test. Don't use the breast milk you pump in the 24 hours after the test. Throw it out. What happens after the test?  · You will probably be able to go home right away. It depends on the reason for the test.  · You can go back to your usual activities right away. Follow-up care is a key part of your treatment and safety. Be sure to make and go to all appointments, and call your doctor if you are having problems. It's also a good idea to keep a list of the medicines you take. Ask your doctor when you can expect to have your test results. Where can you learn more? Go to http://regina-angel.info/  Enter W975 in the search box to learn more about \"MRI of the Breast: About This Test.\"  Current as of: December 9, 2019               Content Version: 12.5  © 0247-6426 [employer]. Care instructions adapted under license by "[employer] (which disclaims liability or warranty for this information). If you have questions about a medical condition or this instruction, always ask your healthcare professional. [address] any warranty or liability for your use of this information.

## 2020-06-22 NOTE — LETTER
6/22/2020 3:04 PM 
 
Patient:  Yolis Lozano YOB: 1965 Date of Visit: 6/22/2020 Dear Dr. Ramy Crocker: Thank you for referring Ms. Norma Fontaine to me for evaluation/treatment. Below are the relevant portions of my assessment and plan of care. If you have questions, please do not hesitate to call me. I look forward to following Ms. Hollins along with you.  
 
 
 
Sincerely, 
 
 
Halie Villeda MD

## 2020-06-22 NOTE — PROGRESS NOTES
HISTORY OF PRESENT ILLNESS Melissa Breaux is a 50 y.o. female. HPI   NEW patient presents for consultation at the request of Dr. 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 The patient has a strong FH of breast cancer in maternal cousins - one cousin diagnosed with breast cancer at age 43, another cousin diagnosed with breast cancer at age 27, and another cousin diagnosed with breast cancer at age 39. A maternal uncle was diagnosed with colon cancer at age 46. Four of these cousins had NBN mutations as well as BRCA 1 mutations. She has felt a RIGHT breast lump times about two months which is non-tender. Has no nipple discharge/retraction or skin change. BILATERAL diagnostic mammogram 4/30/2020, BIRADS 1, negative. Was given an order for a bilateral breast MRI, but has not done this yet. Review of Systems Constitutional: Negative. HENT: Negative. Eyes: Negative. Respiratory: Negative. Cardiovascular: Negative. Gastrointestinal: Negative. Genitourinary: Negative. Musculoskeletal: Negative. Skin: Negative. Neurological: Negative. Endo/Heme/Allergies: Negative. Psychiatric/Behavioral: Negative. Physical Exam 
 
ASSESSMENT and PLAN 
{ASSESSMENT/PLAN:43953}

## 2020-06-30 ENCOUNTER — TELEPHONE (OUTPATIENT)
Dept: SURGERY | Age: 55
End: 2020-06-30

## 2020-06-30 NOTE — TELEPHONE ENCOUNTER
Returned patient's call. A breast MRI was ordered, however the patient found out that her pacemaker and one of her leads is MRI compatible, but the other lead is not. Is very worried because Dr. Vno Tabares stressed that she should have a baseline MRI due to her NBN mutation. I tried to reassure her that she just had a normal mammogram a few months ago, had a normal breast exam, and does not have particularly dense breast tissue and these are all in her favor. I asked her to check with her cardiologist to find out if this lead could be replaced and why this particular lead was used, since she may need other MRIs in addition to breast MRIs and would not be able to have these done. She will do this. I told her Dr. Von Tabares will do some research into other possible imaging modalities and that he or myself would get back to her sometime next week. I encouraged her not to worry since all seems to be fine at this point. She seemed reassured and was very appreciative of the phone call and suggestions.

## 2020-07-01 ENCOUNTER — DOCUMENTATION ONLY (OUTPATIENT)
Dept: SURGERY | Age: 55
End: 2020-07-01

## 2020-07-01 NOTE — PROGRESS NOTES
Faxed order for breast MRI to [facility] at [phone] per patient request.  Confirmation received. Sent a message to patient reminding her to make sure to take images with her or have them pushed from [facility] to [facility].

## 2020-07-07 ENCOUNTER — DOCUMENTATION ONLY (OUTPATIENT)
Dept: SURGERY | Age: 55
End: 2020-07-07

## 2020-07-07 NOTE — PROGRESS NOTES
Faxed Breast MRI order to MRI at [facility] at [phone] and EP lab at [facility] at [phone], confirmations received. Patient informed through [address]

## 2020-07-10 ENCOUNTER — TELEPHONE (OUTPATIENT)
Dept: SURGERY | Age: 55
End: 2020-07-10

## 2020-07-10 NOTE — TELEPHONE ENCOUNTER
Jesse Reeder's call (Imaging at [facility]). They cannot perform a breast MRI on a patient with a pacemaker at their facility. Radiologist feels as this is not a good study to be done on this patient due to the pacemaker. She says that Dr. Colin Donohue tells all patients that they can have a breast MRI, but study is not optimal due to the pacemaker. The patient is aware of the above. I told Tee Washington I would contact the patient about next steps.

## 2020-07-13 DIAGNOSIS — Z91.89 AT HIGH RISK FOR BREAST CANCER: Primary | ICD-10-CM

## 2020-07-14 ENCOUNTER — PATIENT MESSAGE (OUTPATIENT)
Dept: SURGERY | Age: 55
End: 2020-07-14

## 2020-07-15 ENCOUNTER — PATIENT MESSAGE (OUTPATIENT)
Dept: SURGERY | Age: 55
End: 2020-07-15

## 2020-08-06 ENCOUNTER — DOCUMENTATION ONLY (OUTPATIENT)
Dept: SURGERY | Age: 55
End: 2020-08-06

## 2020-08-06 ENCOUNTER — TELEPHONE (OUTPATIENT)
Dept: SURGERY | Age: 55
End: 2020-08-06

## 2020-08-06 ENCOUNTER — HOSPITAL ENCOUNTER (OUTPATIENT)
Dept: MAMMOGRAPHY | Age: 55
Discharge: HOME | End: 2020-08-06
Attending: SURGERY
Payer: COMMERCIAL

## 2020-08-06 DIAGNOSIS — R92.8 ABNORMAL MAMMOGRAM: ICD-10-CM

## 2020-08-06 PROCEDURE — 76641 ULTRASOUND BREAST COMPLETE: CPT

## 2020-08-06 NOTE — PROGRESS NOTES
Imaging from [facility] given to Erica Alvarenga at [facility] to load so that patient's ABUS can be read. Lenora Crocker at [address] [place] [address] informed of this, so she can have the radiologist read this.

## 2020-09-21 ENCOUNTER — HOSPITAL ENCOUNTER (OUTPATIENT)
Dept: MAMMOGRAPHY | Age: 55
Discharge: HOME | End: 2020-09-21
Attending: SURGERY
Payer: COMMERCIAL

## 2020-09-21 DIAGNOSIS — Z12.31 BREAST CANCER SCREENING BY MAMMOGRAM: ICD-10-CM

## 2020-09-21 PROCEDURE — 77063 BREAST TOMOSYNTHESIS BI: CPT

## 2021-01-12 ENCOUNTER — VIRTUAL VISIT (OUTPATIENT)
Dept: INTERNAL MEDICINE CLINIC | Age: 56
End: 2021-01-12
Payer: COMMERCIAL

## 2021-01-12 ENCOUNTER — TELEPHONE (OUTPATIENT)
Dept: INTERNAL MEDICINE CLINIC | Age: 56
End: 2021-01-12

## 2021-01-12 DIAGNOSIS — R53.83 FATIGUE, UNSPECIFIED TYPE: ICD-10-CM

## 2021-01-12 DIAGNOSIS — R05.9 COUGH: ICD-10-CM

## 2021-01-12 DIAGNOSIS — B34.9 VIRAL ILLNESS: Primary | ICD-10-CM

## 2021-01-12 PROCEDURE — 99212 OFFICE O/P EST SF 10 MIN: CPT | Performed by: NURSE PRACTITIONER

## 2021-01-12 NOTE — TELEPHONE ENCOUNTER
Korin, this patient says she was never told that Dr. Rome Cano was leaving, so has not re-established with anyone. Called this morning and asked to speak with someone for advice. Her son had covid last week and now she has symptoms, was tested yesterday and is waiting for the results. Just wants some advice on how to treat her symptoms. I told her that since she never re-established with another provider, she is not considered a patient here any longer and that she may not get a call back. If I can get her to schedule a new patient appt with Velia Ford, would you give her a call back?

## 2021-01-12 NOTE — PROGRESS NOTES
Vernell Lerma is a 50 y.o. female who was seen by synchronous (real-time) audio-video technology on 1/12/2021. Assessment & Plan:   Diagnoses and all orders for this visit:    1. Viral illness    2. Cough    3. Fatigue, unspecified type    concern for covid  rest and hydrate  Quarantine until pcr result  Motrin and tylenol as needed for body aches/fever  Seek emergency treatment if shortness of breath develops    I spent at least 16 minutes on this visit with this established patient. ([id number])  Subjective:   Vernell Lerma was seen for Cough and Fatigue      Patient reports dry cough over the past 4-5 days with body aches, fatigue, sinus congestion, and chest tightness. No fever or shortness of breath. Patient went for a 20 minute walk today with no shortness of breath. Patient has pulse ox at home and her o2 sats are running 97-98. Her son was diagnosed with covid 1.5 weeks ago. She had a rapid covid test done on day 2 of symptoms, which was negative. She now has a PCR covid test pending. Prior to Admission medications    Medication Sig Start Date End Date Taking? Authorizing Provider   metFORMIN (GLUCOPHAGE) 850 mg tablet Take  by mouth two (2) times daily (with meals). Provider, Historical   levothyroxine (SYNTHROID) 137 mcg tablet Take 137 mcg by mouth Daily (before breakfast).     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  levothyroxine (SYNTHROID) 137 mcg tablet Take 137 mcg by mouth Daily (before breakfast). Allergies   Allergen Reactions    Azithromycin Other (comments)     Upset stomach     Past Medical History:   Diagnosis Date    Pacemaker 2019    Thyroid cancer ([address]     @ 24years old     Past Surgical History:   Procedure Laterality Date    HX BREAST BIOPSY Right     2 sites    HX  SECTION      HX HYSTERECTOMY      HX THYROIDECTOMY      HX TOTAL LAPAROSCOPIC HYSTERECTOMY       Family History   Problem Relation Age of Onset    Breast Cancer Cousin 43    Breast Cancer Cousin 27    Breast Cancer Cousin 39     Social History     Tobacco Use    Smoking status: Never Smoker    Smokeless tobacco: Never Used   Substance Use Topics    Alcohol use: Yes     Comment: socially- 3 or 4 days a week       ROS - per HPI      Objective:     Patient-Reported Vitals 2021   Patient-Reported Weight 188   Patient-Reported Height 5'5   Patient-Reported Pulse 87   Patient-Reported Temperature 96.9     General: alert, fatigued, cooperative, no distress   Mental  status: normal mood, behavior, speech, dress, motor activity, and thought processes, able to follow commands   Eyes: EOM intact, normal sclera   Mouth: not examined   Neck: no visualized mass   Resp: normal effort, no respiratory distress and coughing - dry   Neuro: no gross deficits   Musculoskeletal: normal ROM of neck   Skin: no discoloration or lesions of concern on visible areas   Psychiatric: normal affect, no hallucinations             We discussed the expected course, resolution and complications of the diagnosis(es) in detail. Medication risks, benefits, costs, interactions, and alternatives were discussed as indicated. I advised her to contact the office if her condition worsens, changes or fails to improve as anticipated. She expressed understanding with the diagnosis(es) and plan. Guillermo Wood is a 50 y.o. female who was evaluated by a video visit encounter for concerns as above. Patient identification was verified prior to start of the visit. A caregiver was present when appropriate. Due to this being a TeleHealth encounter (During JLOVB-39 public health emergency), evaluation of the following organ systems was limited: Vitals/Constitutional/EENT/Resp/CV/GI//MS/Neuro/Skin/Heme-Lymph-Imm. Pursuant to the emergency declaration under the [facility], [facility] waiver authority and the [employer] and [employer] Act, this Virtual  Visit was conducted, with patient's (and/or legal guardian's) consent, to reduce the patient's risk of exposure to COVID-19 and provide necessary medical care. Services were provided through a synchronous discussion virtually to substitute for in-person clinic visit. I was in the office. The patient was at home.     Mauro Macdonald NP

## 2021-01-12 NOTE — TELEPHONE ENCOUNTER
NP appt scheduled as well VV appt today   Future Appointments   Date Time Provider Jeri Gilbert   1/12/2021  3:20 PM Claudene Bellini, NP [facility] AMB   2/5/2021  9:10 AM MAIRA Temple AMB

## 2021-01-24 ENCOUNTER — APPOINTMENT (OUTPATIENT)
Dept: CT IMAGING | Age: 56
End: 2021-01-24
Attending: EMERGENCY MEDICINE
Payer: COMMERCIAL

## 2021-01-24 ENCOUNTER — HOSPITAL ENCOUNTER (EMERGENCY)
Age: 56
Discharge: HOME | End: 2021-01-24
Attending: EMERGENCY MEDICINE
Payer: COMMERCIAL

## 2021-01-24 VITALS
SYSTOLIC BLOOD PRESSURE: 122 MMHG | TEMPERATURE: 98.5 F | WEIGHT: 190 LBS | OXYGEN SATURATION: 99 % | BODY MASS INDEX: 31.65 KG/M2 | RESPIRATION RATE: 16 BRPM | HEART RATE: 84 BPM | DIASTOLIC BLOOD PRESSURE: 84 MMHG | HEIGHT: 65 IN

## 2021-01-24 DIAGNOSIS — R79.89 ELEVATED SERUM CREATININE: ICD-10-CM

## 2021-01-24 DIAGNOSIS — N12 PYELONEPHRITIS: Primary | ICD-10-CM

## 2021-01-24 LAB
ALBUMIN SERPL-MCNC: 3.9 G/DL (ref 3.5–5)
ALBUMIN/GLOB SERPL: 1.1 {RATIO} (ref 1.1–2.2)
ALP SERPL-CCNC: 69 U/L (ref 45–117)
ALT SERPL-CCNC: 26 U/L (ref 12–78)
ANION GAP SERPL CALC-SCNC: 3 MMOL/L (ref 5–15)
APPEARANCE UR: ABNORMAL
AST SERPL-CCNC: 15 U/L (ref 15–37)
BACTERIA URNS QL MICRO: ABNORMAL /HPF
BASOPHILS # BLD: 0.1 K/UL (ref 0–0.1)
BASOPHILS NFR BLD: 1 % (ref 0–1)
BILIRUB SERPL-MCNC: 0.5 MG/DL (ref 0.2–1)
BILIRUB UR QL: NEGATIVE
BUN SERPL-MCNC: 22 MG/DL (ref 6–20)
BUN/CREAT SERPL: 19 (ref 12–20)
CALCIUM SERPL-MCNC: 9.1 MG/DL (ref 8.5–10.1)
CHLORIDE SERPL-SCNC: 107 MMOL/L (ref 97–108)
CO2 SERPL-SCNC: 29 MMOL/L (ref 21–32)
COLOR UR: ABNORMAL
COMMENT, HOLDF: NORMAL
CREAT SERPL-MCNC: 1.16 MG/DL (ref 0.55–1.02)
DIFFERENTIAL METHOD BLD: ABNORMAL
EOSINOPHIL # BLD: 0 K/UL (ref 0–0.4)
EOSINOPHIL NFR BLD: 0 % (ref 0–7)
EPITH CASTS URNS QL MICRO: ABNORMAL /LPF
ERYTHROCYTE [DISTWIDTH] IN BLOOD BY AUTOMATED COUNT: 12.6 % (ref 11.5–14.5)
GLOBULIN SER CALC-MCNC: 3.6 G/DL (ref 2–4)
GLUCOSE SERPL-MCNC: 131 MG/DL (ref 65–100)
GLUCOSE UR STRIP.AUTO-MCNC: NEGATIVE MG/DL
HCT VFR BLD AUTO: 40.6 % (ref 35–47)
HGB BLD-MCNC: 13.4 G/DL (ref 11.5–16)
HGB UR QL STRIP: ABNORMAL
HYALINE CASTS URNS QL MICRO: ABNORMAL /LPF (ref 0–5)
IMM GRANULOCYTES # BLD AUTO: 0 K/UL (ref 0–0.04)
IMM GRANULOCYTES NFR BLD AUTO: 0 % (ref 0–0.5)
KETONES UR QL STRIP.AUTO: ABNORMAL MG/DL
LEUKOCYTE ESTERASE UR QL STRIP.AUTO: ABNORMAL
LYMPHOCYTES # BLD: 0.8 K/UL (ref 0.8–3.5)
LYMPHOCYTES NFR BLD: 8 % (ref 12–49)
MCH RBC QN AUTO: 30.5 PG (ref 26–34)
MCHC RBC AUTO-ENTMCNC: 33 G/DL (ref 30–36.5)
MCV RBC AUTO: 92.3 FL (ref 80–99)
MONOCYTES # BLD: 0.4 K/UL (ref 0–1)
MONOCYTES NFR BLD: 4 % (ref 5–13)
NEUTS SEG # BLD: 8.2 K/UL (ref 1.8–8)
NEUTS SEG NFR BLD: 87 % (ref 32–75)
NITRITE UR QL STRIP.AUTO: POSITIVE
NRBC # BLD: 0 K/UL (ref 0–0.01)
NRBC BLD-RTO: 0 PER 100 WBC
PH UR STRIP: 5 [PH] (ref 5–8)
PLATELET # BLD AUTO: 184 K/UL (ref 150–400)
PMV BLD AUTO: 10.5 FL (ref 8.9–12.9)
POTASSIUM SERPL-SCNC: 3.4 MMOL/L (ref 3.5–5.1)
PROT SERPL-MCNC: 7.5 G/DL (ref 6.4–8.2)
PROT UR STRIP-MCNC: 100 MG/DL
RBC # BLD AUTO: 4.4 M/UL (ref 3.8–5.2)
RBC #/AREA URNS HPF: >100 /HPF (ref 0–5)
RBC MORPH BLD: ABNORMAL
SAMPLES BEING HELD,HOLD: NORMAL
SODIUM SERPL-SCNC: 139 MMOL/L (ref 136–145)
SP GR UR REFRACTOMETRY: 1.02 (ref 1–1.03)
UR CULT HOLD, URHOLD: NORMAL
UROBILINOGEN UR QL STRIP.AUTO: 1 EU/DL (ref 0.2–1)
WBC # BLD AUTO: 9.5 K/UL (ref 3.6–11)
WBC URNS QL MICRO: >100 /HPF (ref 0–4)

## 2021-01-24 PROCEDURE — 85025 COMPLETE CBC W/AUTO DIFF WBC: CPT

## 2021-01-24 PROCEDURE — 81001 URINALYSIS AUTO W/SCOPE: CPT

## 2021-01-24 PROCEDURE — 87077 CULTURE AEROBIC IDENTIFY: CPT

## 2021-01-24 PROCEDURE — 36415 COLL VENOUS BLD VENIPUNCTURE: CPT

## 2021-01-24 PROCEDURE — 96375 TX/PRO/DX INJ NEW DRUG ADDON: CPT

## 2021-01-24 PROCEDURE — 96365 THER/PROPH/DIAG IV INF INIT: CPT

## 2021-01-24 PROCEDURE — 74011000258 HC RX REV CODE- 258: Performed by: EMERGENCY MEDICINE

## 2021-01-24 PROCEDURE — 99284 EMERGENCY DEPT VISIT MOD MDM: CPT

## 2021-01-24 PROCEDURE — 80053 COMPREHEN METABOLIC PANEL: CPT

## 2021-01-24 PROCEDURE — 87086 URINE CULTURE/COLONY COUNT: CPT

## 2021-01-24 PROCEDURE — 87186 SC STD MICRODIL/AGAR DIL: CPT

## 2021-01-24 PROCEDURE — 74176 CT ABD & PELVIS W/O CONTRAST: CPT

## 2021-01-24 PROCEDURE — 74011250636 HC RX REV CODE- 250/636: Performed by: EMERGENCY MEDICINE

## 2021-01-24 RX ORDER — ONDANSETRON 2 MG/ML
4 INJECTION INTRAMUSCULAR; INTRAVENOUS
Status: COMPLETED | OUTPATIENT
Start: 2021-01-24 | End: 2021-01-24

## 2021-01-24 RX ORDER — KETOROLAC TROMETHAMINE 30 MG/ML
15 INJECTION, SOLUTION INTRAMUSCULAR; INTRAVENOUS
Status: COMPLETED | OUTPATIENT
Start: 2021-01-24 | End: 2021-01-24

## 2021-01-24 RX ORDER — CEFPODOXIME PROXETIL 200 MG/1
200 TABLET, FILM COATED ORAL 2 TIMES DAILY
Qty: 28 TAB | Refills: 0 | Status: SHIPPED | OUTPATIENT
Start: 2021-01-24 | End: 2021-01-27 | Stop reason: ALTCHOICE

## 2021-01-24 RX ADMIN — ONDANSETRON 4 MG: 2 INJECTION INTRAMUSCULAR; INTRAVENOUS at 20:23

## 2021-01-24 RX ADMIN — SODIUM CHLORIDE 1000 ML: 9 INJECTION, SOLUTION INTRAVENOUS at 20:23

## 2021-01-24 RX ADMIN — KETOROLAC TROMETHAMINE 15 MG: 30 INJECTION, SOLUTION INTRAMUSCULAR at 20:23

## 2021-01-24 RX ADMIN — CEFTRIAXONE SODIUM 1 G: 1 INJECTION, POWDER, FOR SOLUTION INTRAMUSCULAR; INTRAVENOUS at 20:54

## 2021-01-24 NOTE — Clinical Note
Lindsay Mcarthur 57 
[address] [facility] [id number] [phone] Work/School Note Date: 1/24/2021 To Whom It May concern: 
 
Marie Thompson was seen and treated today in the emergency room by the following provider(s): 
Attending Provider: Miriam Cervantes MD 
Physician Assistant: CONNIE Ray. Marie Thompson is excused from work/school on 01/24/21 and 01/25/21. She is medically clear to return to work/school on 1/26/2021. Sincerely, CONNIE Gaines

## 2021-01-25 NOTE — ED NOTES
Pt ambulatory to ED with c/o left flank pain, dysuria, nausea and chills onset 2 weeks ago progressively getting worse yesterday.

## 2021-01-25 NOTE — DISCHARGE INSTRUCTIONS
You have a kidney infection called pyelonephritis. This is treated with antibiotics. He also have a slight elevation in your kidney function today in the emergency department, increase your oral fluids and take antibiotics, and follow-up with your primary care physician for repeat lab work in 1 to 2 weeks. Thank you for allowing us to provide you with medical care today. We realize that you have many choices for your emergency care needs. We thank you for choosing [facility] [address].  Please choose us in the future for any continued health care needs. We hope we addressed all of your medical concerns. We strive to provide excellent quality care in the Emergency Department. Anything less than excellent does not meet our expectations. The exam and treatment you received in the Emergency Department were for an emergent problem and are not intended as complete care. It is important that you follow up with a doctor, nurse practitioner, or  [id number] assistant for ongoing care. If your symptoms worsen or you do not improve as expected and you are unable to reach your usual health care provider, you should return to the Emergency Department. We are available 24 hours a day. Take this sheet with you when you go to your follow-up visit. If you have any problem arranging the follow-up visit, contact the Emergency Department immediately. Make an appointment your family doctor for follow up of this visit. Return to the ER if you are unable to be seen in a timely manner.

## 2021-01-27 ENCOUNTER — VIRTUAL VISIT (OUTPATIENT)
Dept: INTERNAL MEDICINE CLINIC | Age: 56
End: 2021-01-27
Payer: COMMERCIAL

## 2021-01-27 DIAGNOSIS — K13.70 ORAL LESION: Primary | ICD-10-CM

## 2021-01-27 DIAGNOSIS — N12 PYELONEPHRITIS: ICD-10-CM

## 2021-01-27 LAB
BACTERIA SPEC CULT: ABNORMAL
CC UR VC: ABNORMAL
SERVICE CMNT-IMP: ABNORMAL

## 2021-01-27 PROCEDURE — 99213 OFFICE O/P EST LOW 20 MIN: CPT | Performed by: NURSE PRACTITIONER

## 2021-01-27 RX ORDER — BUPROPION HYDROCHLORIDE 300 MG/1
300 TABLET ORAL
COMMUNITY

## 2021-01-27 RX ORDER — LEVOTHYROXINE SODIUM 125 UG/1
125 TABLET ORAL
COMMUNITY
Start: 2021-01-20

## 2021-01-27 RX ORDER — CIPROFLOXACIN 500 MG/1
500 TABLET ORAL 2 TIMES DAILY
Qty: 10 TAB | Refills: 0 | Status: SHIPPED | OUTPATIENT
Start: 2021-01-27 | End: 2021-02-01

## 2021-01-27 RX ORDER — ACYCLOVIR 50 MG/G
OINTMENT TOPICAL EVERY 6 HOURS
Qty: 2 G | Refills: 0 | Status: SHIPPED | OUTPATIENT
Start: 2021-01-27

## 2021-01-27 RX ORDER — VALACYCLOVIR HYDROCHLORIDE 1 G/1
1000 TABLET, FILM COATED ORAL DAILY
Qty: 7 TAB | Refills: 0 | Status: SHIPPED | OUTPATIENT
Start: 2021-01-27 | End: 2021-02-03

## 2021-01-27 NOTE — PROGRESS NOTES
Xenia Mena is a 50 y.o. female who was seen by synchronous (real-time) audio-video technology on 1/27/2021. Assessment & Plan:   Diagnoses and all orders for this visit:    1. Oral lesion    2. Pyelonephritis    Other orders  -     valACYclovir (VALTREX) 1 gram tablet; Take 1 Tab by mouth daily for 7 days. -     acyclovir (ZOVIRAX) 5 % ointment; Apply  to affected area every six (6) hours. -     ciprofloxacin HCl (CIPRO) 500 mg tablet; Take 1 Tab by mouth two (2) times a day for 5 days. 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 ([id number])  Subjective:   Xenia Mena was seen for Mouth Lesions and Bladder Infection      Patient reports painful oral blisters around her lips, tongue, and hard/soft palate starting yesterday. Patient does report a history of cold sores, but not this severe. Patient has been very sick in the past 3 weeks. She developed Covid in early January. She states she \"got really sick with this\". She ended up in the ER on 1/24/21 with flank pain, chills, and abdominal pain. She was diagnosed with pyelonephritis. She was started on Cefpodoxime. She reports UTI symptoms and flank pain has improved. She reports fever and headache yesterday and development of oral lesions. She believes rash and headache are caused by the antibiotic, but has no other rash/hives. No gi symptoms reported. Denies lesions on hands or feet. Prior to Admission medications    Medication Sig Start Date End Date Taking? Authorizing Provider   valACYclovir (VALTREX) 1 gram tablet Take 1 Tab by mouth daily for 7 days.  1/27/21 2/3/21 Yes Carlos VELAZQUEZ, WENDY   acyclovir (ZOVIRAX) 5 % ointment Apply  to affected area every six (6) hours. 1/27/21  Yes Carlos VELAZQUEZ NP   ciprofloxacin HCl (CIPRO) 500 mg tablet Take 1 Tab by mouth two (2) times a day for 5 days. 1/27/21 2/1/21 Yes Carlos VELAZQUEZ NP   cefpodoxime [place]) 200 mg tablet Take 1 Tab by mouth two (2) times a day for 14 days. 1/24/21 2/7/21  Thania Mitchell PA   metFORMIN (GLUCOPHAGE) 850 mg tablet Take  by mouth two (2) times daily (with meals). 1/27/21  Provider, Historical   levothyroxine (SYNTHROID) 137 mcg tablet Take 137 mcg by mouth Daily (before breakfast). 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 7 Tab 0    acyclovir (ZOVIRAX) 5 % ointment Apply  to affected area every six (6) hours. 2 g 0    ciprofloxacin HCl (CIPRO) 500 mg tablet Take 1 Tab by mouth two (2) times a day for 5 days. 10 Tab 0    cefpodoxime (VANTIN) 200 mg tablet Take 1 Tab by mouth two (2) times a day for 14 days. 28 Tab 0    levothyroxine (SYNTHROID) 137 mcg tablet Take 137 mcg by mouth Daily (before breakfast).        Allergies   Allergen Reactions    Azithromycin Other (comments)     Upset stomach     Past Medical History:   Diagnosis Date    Pacemaker 11/2019    Thyroid cancer [facility])     @ 24years old     Past Surgical History:   Procedure Laterality Date    HX BREAST BIOPSY Right     2 sites    HX  SECTION      HX HYSTERECTOMY      HX THYROIDECTOMY      HX TOTAL LAPAROSCOPIC HYSTERECTOMY       Family History   Problem Relation Age of Onset    Breast Cancer Cousin 43    Breast Cancer Cousin 27    Breast Cancer Cousin 39     Social History     Tobacco Use    Smoking status: Never Smoker    Smokeless tobacco: Never Used   Substance Use Topics    Alcohol use: Yes     Comment: socially- 3 or 4 days a week       ROS - per HPI      Objective:     Patient-Reported Vitals 2021   Patient-Reported Weight 188   Patient-Reported Height 5'5   Patient-Reported Pulse 87   Patient-Reported Temperature 96.9     General: alert, cooperative, no distress   Mental  status: normal mood, behavior, speech, dress, motor activity, and thought processes, able to follow commands   Eyes: EOM intact, normal sclera   Mouth: oral lesion noted vesicular lesions of upper and lower lips, tongue, hard palate, and soft palate; lesions are crusted on lips with erythematous based, mild swelling noted and pain   Neck: no visualized mass   Resp: normal effort and no respiratory distress   Neuro: no gross deficits   Musculoskeletal: normal ROM of neck   Skin: Oral lesions; per patient no rash on hands or feet   Psychiatric: normal affect, no hallucinations             We discussed the expected course, resolution and complications of the diagnosis(es) in detail. Medication risks, benefits, costs, interactions, and alternatives were discussed as indicated. I advised her to contact the office if her condition worsens, changes or fails to improve as anticipated. She expressed understanding with the diagnosis(es) and plan. Marie Thompson is a 50 y.o. female who was evaluated by a video visit encounter for concerns as above. Patient identification was verified prior to start of the visit. A caregiver was present when appropriate.  Due to this being a TeleHealth encounter (During COVID-19 public health emergency), evaluation of the following organ systems was limited: Vitals/Constitutional/EENT/Resp/CV/GI//MS/Neuro/Skin/Heme-Lymph-Imm. Pursuant to the emergency declaration under the [address] [place] [address], [facility] waiver authority and the [employer] and [employer] Act, this Virtual  Visit was conducted, with patient's (and/or legal guardian's) consent, to reduce the patient's risk of exposure to COVID-19 and provide necessary medical care. Services were provided through a synchronous discussion virtually to substitute for in-person clinic visit. I was in the office. The patient was at home.     Nona Cole NP

## 2021-07-12 ENCOUNTER — OFFICE VISIT (OUTPATIENT)
Dept: URGENT CARE | Age: 56
End: 2021-07-12
Payer: COMMERCIAL

## 2021-07-12 VITALS — OXYGEN SATURATION: 97 % | RESPIRATION RATE: 16 BRPM | TEMPERATURE: 98.1 F | HEART RATE: 71 BPM

## 2021-07-12 DIAGNOSIS — J01.90 ACUTE SINUSITIS, RECURRENCE NOT SPECIFIED, UNSPECIFIED LOCATION: Primary | ICD-10-CM

## 2021-07-12 DIAGNOSIS — J20.9 ACUTE BRONCHITIS, UNSPECIFIED ORGANISM: ICD-10-CM

## 2021-07-12 PROCEDURE — 99203 OFFICE O/P NEW LOW 30 MIN: CPT | Performed by: NURSE PRACTITIONER

## 2021-07-12 RX ORDER — DOXYCYCLINE 100 MG/1
100 TABLET ORAL 2 TIMES DAILY
Qty: 14 TABLET | Refills: 0 | Status: SHIPPED | OUTPATIENT
Start: 2021-07-12 | End: 2021-07-19

## 2021-07-12 RX ORDER — PREDNISONE 20 MG/1
TABLET ORAL
Qty: 6 TABLET | Refills: 0 | Status: SHIPPED | OUTPATIENT
Start: 2021-07-12

## 2021-07-12 NOTE — PROGRESS NOTES
Subjective: (As above and below)       This patient was seen in Flu Clinic at [address] [place] [address] Urgent Care while outdoors at their vehicle due to COVID-19 pandemic with PPE and focused examination in order to decrease community viral transmission. The patient/guardian gave verbal consent to treat. Chief Complaint   Patient presents with    Cough     x10 days constant, a little congestion and no fever     David Messer is a 49 y.o. female who presents for evaluation of : cough x 10 days. Worse when laying down with post nasal drip. Preceding illness: no.  No other identified aggravating or alleviating factors. Symptoms are constant and overall not improving. Did try codeine cough syrup without much relief. Promotes no decrease in PO intake of fluids. Denies: SOB, , chest pain, chest pain with breathing, labored breathing    Recent travel: no  Known Exposure to COVID-19: no known exposure; is fully vaccinated against COVID  Known flu or strep contact: no    ROS + for nasal congestion frontal sinus pressure  Review of Systems - negative except as listed above    Reviewed PmHx, RxHx, FmHx, SocHx, AllgHx and updated in chart.   Family History   Problem Relation Age of Onset    Breast Cancer Cousin 43    Breast Cancer Cousin 27    Breast Cancer Cousin 39       Past Medical History:   Diagnosis Date    Pacemaker 11/2019    Thyroid cancer [facility])     @ 24years old      Social History     Socioeconomic History    Marital status:      Spouse name: Not on file    Number of children: Not on file    Years of education: Not on file    Highest education level: Not on file   Tobacco Use    Smoking status: Never Smoker    Smokeless tobacco: Never Used   Substance and Sexual Activity    Alcohol use: Yes     Comment: socially- 3 or 4 days a week     Social Determinants of Health     Financial Resource Strain:     Difficulty of Paying Living Expenses:    Food Insecurity:     Worried About Running Out of Food in the Last Year:    [address] in the Last Year:    Transportation Needs:     Lack of Transportation (Medical):  Lack of Transportation (Non-Medical):    Physical Activity:     Days of Exercise per Week:     Minutes of Exercise per Session:    Stress:     Feeling of Stress :    Social Connections:     Frequency of Communication with Friends and Family:     Frequency of Social Gatherings with Friends and Family:     Attends [employer] Services:     Active Member of Clubs or Organizations:     Attends Club or Organization Meetings:     Marital Status:           Current Outpatient Medications   Medication Sig    predniSONE (DELTASONE) 20 mg tablet Take 2 tabs by mouth one time daily with food for 3 days.  doxycycline (ADOXA) 100 mg tablet Take 1 Tablet by mouth two (2) times a day for 7 days.  acyclovir (ZOVIRAX) 5 % ointment Apply  to affected area every six (6) hours.  buPROPion XL (WELLBUTRIN XL) 300 mg XL tablet 300 mg.  Synthroid 125 mcg tablet 125 mcg. No current facility-administered medications for this visit. Objective:     Vitals:    07/12/21 0951   Pulse: 71   Resp: 16   Temp: 98.1 °F (36.7 °C)   SpO2: 97%       Physical Exam  General appearance  appears well hydrated and does not appear toxic, no acute distress  Eyes - EOMs intact. Non injected. No scleral icterus   Ears - no external swelling  Nose - thick mucus nasal passages bilat. No purulent drainage  Mouth - OP clear without swelling, exudate or lesion. Mucus membranes moist. Uvula midline. Neck/Lymphatics  trachea midline, full AROM  Chest - Normal breathing effort no wheeze rales, rhonchi or diminishments bilaterally. Heart - RRR, no murmurs  Skin - no observable rashes or pallor  Neurologic- alert and oriented x 3  Psychiatric- normal mood, behavior and though content. Assessment/ Plan:     1.  Acute sinusitis, recurrence not specified, unspecified location    - predniSONE (Lonell Box) 20 mg tablet; Take 2 tabs by mouth one time daily with food for 3 days. Dispense: 6 Tablet; Refill: 0  - doxycycline (ADOXA) 100 mg tablet; Take 1 Tablet by mouth two (2) times a day for 7 days. Dispense: 14 Tablet; Refill: 0    2. Acute bronchitis, unspecified organism    - predniSONE (DELTASONE) 20 mg tablet; Take 2 tabs by mouth one time daily with food for 3 days. Dispense: 6 Tablet; Refill: 0  - doxycycline (ADOXA) 100 mg tablet; Take 1 Tablet by mouth two (2) times a day for 7 days. Dispense: 14 Tablet; Refill: 0    Suspect post nasal drip as primary cause of cough, possible early bacterial sinusitis so will treat with doxycycline. Prednisone 40 daily x 3 days for cough as codeine hasnt helped. Lungs clear and VS otherwise stable so no indication for CXR at this time. Supportive home care for mild symptoms advised- maintain adequate fluid intake, lozenges, over the counter Tylenol (for fever, aches, pains, chills),   Patient preference to hold off on covid 19 testing- this is reasonable given duration of current symptoms and likely wont change current management. Follow up:  Return immediately for new, worsening or changes  Follow up here or with PCP if continued symptoms past 1 week from today.         Bailey Velasquez NP

## 2021-07-15 ENCOUNTER — PATIENT MESSAGE (OUTPATIENT)
Dept: INTERNAL MEDICINE CLINIC | Age: 56
End: 2021-07-15

## 2021-07-15 NOTE — TELEPHONE ENCOUNTER
Detail Level: Detailed From: You Machado  To: MAIRA Batista  Sent: 7/14/2021 6:29 PM EDT  Subject: Appointment Cancellation Request    You Machado would like to cancel the following appointments:    MAIRA Ferrell in [facility] ([phone]), 8/23/2021 8:30 AM    Comments:  I am so sorry to have to cancel. 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 Can you put me on a cancelation list for anytime in August before I go back otherwise may I schedule after 3:30 any other day? Thank you for seeing me!   [facility] Quality 130: Documentation Of Current Medications In The Medical Record: Current Medications Documented Quality 265: Biopsy Follow-Up: Biopsy results reviewed, communicated, tracked, and documented

## 2022-03-19 PROBLEM — Z15.01 MONOALLELIC MUTATION OF NBN GENE IN FEMALE: Status: ACTIVE | Noted: 2020-06-22

## 2022-03-19 PROBLEM — Z15.09 MONOALLELIC MUTATION OF NBN GENE IN FEMALE: Status: ACTIVE | Noted: 2020-06-22

## 2022-03-19 PROBLEM — R55 SYNCOPE: Status: ACTIVE | Noted: 2019-04-28

## 2022-03-19 PROBLEM — I87.303 STASIS EDEMA OF BOTH LOWER EXTREMITIES: Status: ACTIVE | Noted: 2018-01-16

## 2022-03-19 PROBLEM — E03.9 ACQUIRED HYPOTHYROIDISM: Status: ACTIVE | Noted: 2018-08-03

## 2022-03-19 PROBLEM — F41.9 ANXIETY: Status: ACTIVE | Noted: 2018-05-15

## 2022-03-19 PROBLEM — Z15.02 MONOALLELIC MUTATION OF NBN GENE IN FEMALE: Status: ACTIVE | Noted: 2020-06-22

## 2022-03-19 PROBLEM — Z15.89 MONOALLELIC MUTATION OF NBN GENE IN FEMALE: Status: ACTIVE | Noted: 2020-06-22

## 2024-03-13 ENCOUNTER — OFFICE VISIT (OUTPATIENT)
Age: 59
End: 2024-03-13
Payer: COMMERCIAL

## 2024-03-13 VITALS — WEIGHT: 192 LBS | BODY MASS INDEX: 31.99 KG/M2 | HEIGHT: 65 IN

## 2024-03-13 DIAGNOSIS — Z15.89 MONOALLELIC MUTATION OF NBN GENE IN FEMALE: Primary | ICD-10-CM

## 2024-03-13 DIAGNOSIS — Z15.02 MONOALLELIC MUTATION OF NBN GENE IN FEMALE: Primary | ICD-10-CM

## 2024-03-13 DIAGNOSIS — Z15.01 MONOALLELIC MUTATION OF NBN GENE IN FEMALE: Primary | ICD-10-CM

## 2024-03-13 DIAGNOSIS — Z15.09 MONOALLELIC MUTATION OF NBN GENE IN FEMALE: Primary | ICD-10-CM

## 2024-03-13 PROCEDURE — 1036F TOBACCO NON-USER: CPT | Performed by: SURGERY

## 2024-03-13 PROCEDURE — G8484 FLU IMMUNIZE NO ADMIN: HCPCS | Performed by: SURGERY

## 2024-03-13 PROCEDURE — G8417 CALC BMI ABV UP PARAM F/U: HCPCS | Performed by: SURGERY

## 2024-03-13 PROCEDURE — 99213 OFFICE O/P EST LOW 20 MIN: CPT | Performed by: SURGERY

## 2024-03-13 PROCEDURE — 3017F COLORECTAL CA SCREEN DOC REV: CPT | Performed by: SURGERY

## 2024-03-13 PROCEDURE — G8427 DOCREV CUR MEDS BY ELIG CLIN: HCPCS | Performed by: SURGERY

## 2024-03-13 NOTE — PROGRESS NOTES
HISTORY OF PRESENT ILLNESS  Erika Gruber is a 58 y.o. female     HPI NEW patient previous NBN mutation follow up.      Family History:  (3) Three maternal  1st cousins breast cancer btw ages 20's & 30's-survivors  Niece-breast cancer-survivor      Mammogram, 2/7/2023, [facility]  [facility]-negative    Review of Systems      Physical Exam       ASSESSMENT and PLAN  {Assessment and Plan Chronic Disease:8452331186}    
      Allergies   Allergen Reactions    Azithromycin Other (See Comments)     Upset stomach       OB History    No obstetric history on file.      Obstetric Comments   Menarche 17, LMP 12/2002, # of children 5, age of 1st delivery 28, Hysterectomy/oophorectomy yes/yes, Breast bx yes, history of breast feeding yes, BCP yes, Hormone therapy no               Review of Systems        Physical Exam  Exam conducted with a chaperone present.   Constitutional:       Appearance: She is not diaphoretic.   HENT:      Head: Normocephalic and atraumatic.      Right Ear: External ear normal.      Left Ear: External ear normal.   Eyes:      General: No scleral icterus.        Right eye: No discharge.         Left eye: No discharge.      Pupils: Pupils are equal, round, and reactive to light.   Cardiovascular:      Rate and Rhythm: Normal rate and regular rhythm.   Pulmonary:      Effort: Pulmonary effort is normal. No respiratory distress.      Breath sounds: Normal breath sounds. No stridor.   Chest:   Breasts:     Right: Normal.      Left: Normal.   Abdominal:      General: There is no distension.      Palpations: Abdomen is soft. There is no mass.      Tenderness: There is no abdominal tenderness.   Musculoskeletal:         General: Normal range of motion.      Cervical back: Normal range of motion and neck supple.   Lymphadenopathy:      Cervical: No cervical adenopathy.   Skin:     General: Skin is warm.   Neurological:      Mental Status: She is alert and oriented to person, place, and time.   Psychiatric:         Behavior: Behavior normal.         Thought Content: Thought content normal.         Judgment: Judgment normal.         ASSESSMENT and PLAN   Diagnosis Orders   1. 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 Her physical exam was normal. The patient expressed concern about not being able to have MRIs because of her pacemaker. I

## 2025-03-19 ENCOUNTER — OFFICE VISIT (OUTPATIENT)
Age: 60
End: 2025-03-19
Payer: COMMERCIAL

## 2025-03-19 VITALS — HEIGHT: 65 IN | BODY MASS INDEX: 29.66 KG/M2 | WEIGHT: 178 LBS

## 2025-03-19 DIAGNOSIS — Z15.89 MONOALLELIC MUTATION OF NBN GENE IN FEMALE: Primary | ICD-10-CM

## 2025-03-19 DIAGNOSIS — Z15.09 MONOALLELIC MUTATION OF NBN GENE IN FEMALE: Primary | ICD-10-CM

## 2025-03-19 DIAGNOSIS — Z15.01 MONOALLELIC MUTATION OF NBN GENE IN FEMALE: Primary | ICD-10-CM

## 2025-03-19 DIAGNOSIS — Z15.02 MONOALLELIC MUTATION OF NBN GENE IN FEMALE: Primary | ICD-10-CM

## 2025-03-19 PROCEDURE — 76642 ULTRASOUND BREAST LIMITED: CPT | Performed by: SURGERY

## 2025-03-19 PROCEDURE — 99213 OFFICE O/P EST LOW 20 MIN: CPT | Performed by: SURGERY

## 2025-03-19 RX ORDER — ATORVASTATIN CALCIUM 20 MG/1
20 TABLET, FILM COATED ORAL DAILY
COMMUNITY
Start: 2024-12-25

## 2025-03-19 NOTE — PROGRESS NOTES
HISTORY OF PRESENT ILLNESS  Erika Gruber is a 59 y.o. female     HPI ESTABLISHED patient here for annual follow up on NBN mutation. Patient denies any changes or issues at this time. Patient called [facility] to confirm date of last mammogram while here and will schedule her mammogram when she is done here.     Last Mammogram, [facility], 2/7/23 [facility] 1    No MRI as patient has pacemaker.       Review of Systems      Physical Exam       ASSESSMENT and PLAN  {Assessment and Plan Chronic Disease:[id number]}  
high-frequency linear-array near-field transducer  Findings: no acute findings  Impression: normal US  Disposition: follow assessment and plan below    ASSESSMENT and PLAN   Diagnosis Orders   1. 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 Physical exam of BILATERAL breasts noted thickening in the LEFT upper outer quadrant on exam. Normal US LEFT breast.     F/U 1 year. This plan was reviewed with the patient and patient agrees. All questions were answered.    Total time spent excluding procedural time was 20 minutes.    Malvin BRIAN, am scribing for and in the presence of Usama Palma Jr, MD. 3/19/25/3:38 PM EDT    I, Dr. Palma, personally performed the services described in this document as scribed by Malvin Bond in my presence, and it is both accurate and complete.

## 2025-06-06 ENCOUNTER — TRANSCRIBE ORDERS (OUTPATIENT)
Facility: HOSPITAL | Age: 60
End: 2025-06-06

## 2025-06-06 DIAGNOSIS — E78.5 HYPERLIPIDEMIA, UNSPECIFIED HYPERLIPIDEMIA TYPE: Primary | ICD-10-CM

## 2025-06-13 NOTE — ED PROVIDER NOTES
48 y.o. female with past medical history significant for CA (Thyroid, s/p thyroidectomy) who presents to the ED via EMS, s/p LOC just PTA, after she had taken just a few bites of dinner (spinach and fish). She states that after a few bites of her food, she began experiencing nausea, and last recalls the sensation of the room spinning, before she regained consciousness. She states that she woke vomiting and felt disoriented. EMS noted that when they arrived on scene, pt was pale, cool and diaphoretic. She received Zofran en route to the ED. Pt denies HA, CP, SOB, and hx of any cardiac issues. She notes that she takes Synthroid regularly and has been taking HCG for weight loss for the last 6 weeks. There are no other acute medical concerns at this time. Negative Tobacco use; Positive EtOH use; Negative Illicit Drug Abuse PCP: Ladan Brewster MD 
 
Note written by Lourdes Emanuel, as dictated by Lalit Cordero MD 10:38 PM  
 
The history is provided by the patient, medical records and the EMS personnel. No  was used. Past Medical History:  
Diagnosis Date  Thyroid cancer [facility])   
 @ 24years old Past Surgical History:  
Procedure Laterality Date  HX  SECTION    
 HX THYROIDECTOMY  HX TOTAL LAPAROSCOPIC HYSTERECTOMY History reviewed. No pertinent family history. Social History Socioeconomic History  Marital status:  Spouse name: Not on file  Number of children: Not on file  Years of education: Not on file  Highest education level: Not on file Occupational History  Not on file Social Needs  Financial resource strain: Not on file  Food insecurity:  
  Worry: Not on file Inability: Not on file  Transportation needs:  
  Medical: Not on file Non-medical: Not on file Tobacco Use  Smoking status: Never Smoker  Smokeless tobacco: Never Used Substance and Sexual Activity Called pt and scheduled for her to come in,  She will start to wean off the Wellbutrin    Alcohol use: Yes Comment: socially- 3 or 4 days a week  Drug use: Not on file  Sexual activity: Not on file Lifestyle  Physical activity:  
  Days per week: Not on file Minutes per session: Not on file  Stress: Not on file Relationships  Social connections:  
  Talks on phone: Not on file Gets together: Not on file Attends Sabianism service: Not on file Active member of club or organization: Not on file Attends meetings of clubs or organizations: Not on file Relationship status: Not on file  Intimate partner violence:  
  Fear of current or ex partner: Not on file Emotionally abused: Not on file Physically abused: Not on file Forced sexual activity: Not on file Other Topics Concern  Not on file Social History Narrative  Not on file ALLERGIES: Azithromycin Review of Systems Constitutional: Negative for fever. HENT: Negative for facial swelling and nosebleeds. Eyes: Negative for pain. Respiratory: Negative for cough, chest tightness and shortness of breath. Cardiovascular: Negative for chest pain and leg swelling. Gastrointestinal: Positive for nausea and vomiting. Negative for abdominal pain and diarrhea. Endocrine: Negative for polyuria. Genitourinary: Negative for difficulty urinating and flank pain. Musculoskeletal: Negative for arthralgias and back pain. Skin: Negative for color change. Allergic/Immunologic: Negative for immunocompromised state. Neurological: Positive for dizziness and syncope. Negative for headaches. Hematological: Does not bruise/bleed easily. Psychiatric/Behavioral: Negative for agitation. All other systems reviewed and are negative. Vitals:  
 04/27/19 2244 04/27/19 2245 BP: 101/63 101/63 Resp: 16 SpO2: 97% Physical Exam  
Constitutional: She is oriented to person, place, and time. She appears well-developed and well-nourished. No distress.   
HENT:  
 Head: Normocephalic and atraumatic. Right Ear: External ear normal.  
Left Ear: External ear normal.  
Eyes: Conjunctivae and EOM are normal.  
Neck: Normal range of motion. Neck supple. No tracheal deviation present. No thyromegaly present. Cardiovascular: Normal rate, regular rhythm, normal heart sounds and intact distal pulses. Exam reveals no gallop and no friction rub. No murmur heard. Pulmonary/Chest: Effort normal and breath sounds normal. No respiratory distress. She has no wheezes. She has no rales. She exhibits no tenderness. Abdominal: Soft. Bowel sounds are normal. She exhibits no distension. There is no tenderness. Musculoskeletal: Normal range of motion. She exhibits no edema, tenderness or deformity. Neurological: She is alert and oriented to person, place, and time. She displays normal reflexes. No cranial nerve deficit. She exhibits normal muscle tone. Coordination normal.  
No facial droop Normal facial sensation 5/5 hand  strength 5/5 plantar flexion of feet Normal sensation of arms and legs Skin: Skin is warm and dry. No rash noted. She is not diaphoretic. No erythema. Psychiatric: She has a normal mood and affect. Her behavior is normal. Judgment and thought content normal.  
  
Note written by Lourdes Tao, as dictated by Janie Workman MD 10:38 PM  
 
MDM Number of Diagnoses or Management Options Diagnosis management comments: 51-year-old white female presents to the emergency department after a syncopal episode. The patient was at dinner when she started to feel dizzy he and had a syncopal episode. She had a little bit shaky. She passed out. She is feeling dizzy with room spinning. Will check CT of the head. We'll check chest x-ray. Will check EKG. We'll check urine and blood work. We'll reassess shortly when testing is back. Patient agrees. Amount and/or Complexity of Data Reviewed Clinical lab tests: ordered and reviewed Tests in the radiology section of CPT®: ordered and reviewed Tests in the medicine section of CPT®: ordered and reviewed Decide to obtain previous medical records or to obtain history from someone other than the patient: yes Obtain history from someone other than the patient: yes Review and summarize past medical records: yes Independent visualization of images, tracings, or specimens: yes Procedures CT head shows no acute process EKG: NSR, HR 60, normal axis, QTc 482 ms, no ST elevations or depressions Parker MD Raul 
 
CXR shows no acute process 12:46 AM 
Pt w/ syncope and shaking afterwards Pt vomited after passing out She was feeling dizzy and nauseated Will admit for further monitoring and testing